# Patient Record
Sex: FEMALE | Race: OTHER | HISPANIC OR LATINO | Employment: OTHER | URBAN - METROPOLITAN AREA
[De-identification: names, ages, dates, MRNs, and addresses within clinical notes are randomized per-mention and may not be internally consistent; named-entity substitution may affect disease eponyms.]

---

## 2022-07-04 ENCOUNTER — HOSPITAL ENCOUNTER (OUTPATIENT)
Facility: HOSPITAL | Age: 87
Setting detail: OBSERVATION
Discharge: HOME | End: 2022-07-05
Attending: EMERGENCY MEDICINE | Admitting: INTERNAL MEDICINE
Payer: MEDICAID

## 2022-07-04 ENCOUNTER — APPOINTMENT (EMERGENCY)
Dept: CT IMAGING | Facility: HOSPITAL | Age: 87
End: 2022-07-04
Payer: MEDICAID

## 2022-07-04 ENCOUNTER — APPOINTMENT (EMERGENCY)
Dept: RADIOLOGY | Facility: HOSPITAL | Age: 87
End: 2022-07-04
Payer: MEDICAID

## 2022-07-04 DIAGNOSIS — R77.8 ELEVATED TROPONIN: ICD-10-CM

## 2022-07-04 DIAGNOSIS — R07.9 CHEST PAIN: Primary | ICD-10-CM

## 2022-07-04 DIAGNOSIS — N17.9 AKI (ACUTE KIDNEY INJURY) (HCC): ICD-10-CM

## 2022-07-04 LAB
2HR DELTA HS TROPONIN: 19 NG/L
ANION GAP SERPL CALCULATED.3IONS-SCNC: 14 MMOL/L (ref 4–13)
ATRIAL RATE: 110 BPM
ATRIAL RATE: 122 BPM
ATRIAL RATE: 125 BPM
BASOPHILS # BLD AUTO: 0.04 THOUSANDS/ΜL (ref 0–0.1)
BASOPHILS NFR BLD AUTO: 0 % (ref 0–1)
BUN SERPL-MCNC: 42 MG/DL (ref 5–25)
CALCIUM SERPL-MCNC: 8.9 MG/DL (ref 8.3–10.1)
CARDIAC TROPONIN I PNL SERPL HS: 11 NG/L
CARDIAC TROPONIN I PNL SERPL HS: 30 NG/L
CHLORIDE SERPL-SCNC: 108 MMOL/L (ref 100–108)
CO2 SERPL-SCNC: 23 MMOL/L (ref 21–32)
CREAT SERPL-MCNC: 1.98 MG/DL (ref 0.6–1.3)
D DIMER PPP FEU-MCNC: 0.56 UG/ML FEU
EOSINOPHIL # BLD AUTO: 0.02 THOUSAND/ΜL (ref 0–0.61)
EOSINOPHIL NFR BLD AUTO: 0 % (ref 0–6)
ERYTHROCYTE [DISTWIDTH] IN BLOOD BY AUTOMATED COUNT: 13.7 % (ref 11.6–15.1)
GFR SERPL CREATININE-BSD FRML MDRD: 22 ML/MIN/1.73SQ M
GLUCOSE SERPL-MCNC: 156 MG/DL (ref 65–140)
HCT VFR BLD AUTO: 28.6 % (ref 34.8–46.1)
HGB BLD-MCNC: 9.7 G/DL (ref 11.5–15.4)
IMM GRANULOCYTES # BLD AUTO: 0.04 THOUSAND/UL (ref 0–0.2)
IMM GRANULOCYTES NFR BLD AUTO: 0 % (ref 0–2)
LYMPHOCYTES # BLD AUTO: 3.55 THOUSANDS/ΜL (ref 0.6–4.47)
LYMPHOCYTES NFR BLD AUTO: 28 % (ref 14–44)
MCH RBC QN AUTO: 30.4 PG (ref 26.8–34.3)
MCHC RBC AUTO-ENTMCNC: 33.9 G/DL (ref 31.4–37.4)
MCV RBC AUTO: 90 FL (ref 82–98)
MONOCYTES # BLD AUTO: 0.85 THOUSAND/ΜL (ref 0.17–1.22)
MONOCYTES NFR BLD AUTO: 7 % (ref 4–12)
NEUTROPHILS # BLD AUTO: 8.01 THOUSANDS/ΜL (ref 1.85–7.62)
NEUTS SEG NFR BLD AUTO: 65 % (ref 43–75)
NRBC BLD AUTO-RTO: 0 /100 WBCS
P AXIS: 66 DEGREES
P AXIS: 81 DEGREES
P AXIS: 82 DEGREES
PLATELET # BLD AUTO: 537 THOUSANDS/UL (ref 149–390)
PMV BLD AUTO: 9.8 FL (ref 8.9–12.7)
POTASSIUM SERPL-SCNC: 4.5 MMOL/L (ref 3.5–5.3)
PR INTERVAL: 140 MS
PR INTERVAL: 142 MS
PR INTERVAL: 144 MS
QRS AXIS: 15 DEGREES
QRS AXIS: 2 DEGREES
QRS AXIS: 8 DEGREES
QRSD INTERVAL: 60 MS
QRSD INTERVAL: 64 MS
QRSD INTERVAL: 68 MS
QT INTERVAL: 298 MS
QT INTERVAL: 304 MS
QT INTERVAL: 314 MS
QTC INTERVAL: 403 MS
QTC INTERVAL: 433 MS
QTC INTERVAL: 453 MS
RBC # BLD AUTO: 3.19 MILLION/UL (ref 3.81–5.12)
SODIUM SERPL-SCNC: 145 MMOL/L (ref 136–145)
T WAVE AXIS: 48 DEGREES
T WAVE AXIS: 53 DEGREES
T WAVE AXIS: 60 DEGREES
VENTRICULAR RATE: 110 BPM
VENTRICULAR RATE: 122 BPM
VENTRICULAR RATE: 125 BPM
WBC # BLD AUTO: 12.51 THOUSAND/UL (ref 4.31–10.16)

## 2022-07-04 PROCEDURE — 36415 COLL VENOUS BLD VENIPUNCTURE: CPT

## 2022-07-04 PROCEDURE — 71046 X-RAY EXAM CHEST 2 VIEWS: CPT

## 2022-07-04 PROCEDURE — 99285 EMERGENCY DEPT VISIT HI MDM: CPT

## 2022-07-04 PROCEDURE — 85025 COMPLETE CBC W/AUTO DIFF WBC: CPT

## 2022-07-04 PROCEDURE — 96374 THER/PROPH/DIAG INJ IV PUSH: CPT

## 2022-07-04 PROCEDURE — 80076 HEPATIC FUNCTION PANEL: CPT | Performed by: PHYSICIAN ASSISTANT

## 2022-07-04 PROCEDURE — 96375 TX/PRO/DX INJ NEW DRUG ADDON: CPT

## 2022-07-04 PROCEDURE — G1004 CDSM NDSC: HCPCS

## 2022-07-04 PROCEDURE — 80048 BASIC METABOLIC PNL TOTAL CA: CPT

## 2022-07-04 PROCEDURE — 93010 ELECTROCARDIOGRAM REPORT: CPT

## 2022-07-04 PROCEDURE — 96366 THER/PROPH/DIAG IV INF ADDON: CPT

## 2022-07-04 PROCEDURE — 84443 ASSAY THYROID STIM HORMONE: CPT | Performed by: EMERGENCY MEDICINE

## 2022-07-04 PROCEDURE — 83690 ASSAY OF LIPASE: CPT | Performed by: PHYSICIAN ASSISTANT

## 2022-07-04 PROCEDURE — 74174 CTA ABD&PLVS W/CONTRAST: CPT

## 2022-07-04 PROCEDURE — 96365 THER/PROPH/DIAG IV INF INIT: CPT

## 2022-07-04 PROCEDURE — 85379 FIBRIN DEGRADATION QUANT: CPT

## 2022-07-04 PROCEDURE — 93005 ELECTROCARDIOGRAM TRACING: CPT

## 2022-07-04 PROCEDURE — 99285 EMERGENCY DEPT VISIT HI MDM: CPT | Performed by: EMERGENCY MEDICINE

## 2022-07-04 PROCEDURE — 84484 ASSAY OF TROPONIN QUANT: CPT

## 2022-07-04 PROCEDURE — 71275 CT ANGIOGRAPHY CHEST: CPT

## 2022-07-04 RX ORDER — LOSARTAN POTASSIUM 100 MG/1
100 TABLET ORAL DAILY
COMMUNITY
Start: 2022-06-21 | End: 2022-08-24 | Stop reason: SDUPTHER

## 2022-07-04 RX ORDER — QUETIAPINE FUMARATE 25 MG/1
50 TABLET, FILM COATED ORAL DAILY
COMMUNITY
Start: 2022-06-21 | End: 2022-08-24 | Stop reason: SDUPTHER

## 2022-07-04 RX ORDER — NITROGLYCERIN 0.4 MG/1
0.4 TABLET SUBLINGUAL ONCE
Status: COMPLETED | OUTPATIENT
Start: 2022-07-04 | End: 2022-07-04

## 2022-07-04 RX ORDER — CLOPIDOGREL BISULFATE 75 MG/1
75 TABLET ORAL DAILY
COMMUNITY
Start: 2022-06-21

## 2022-07-04 RX ORDER — FENTANYL CITRATE 50 UG/ML
25 INJECTION, SOLUTION INTRAMUSCULAR; INTRAVENOUS ONCE
Status: COMPLETED | OUTPATIENT
Start: 2022-07-04 | End: 2022-07-04

## 2022-07-04 RX ORDER — SODIUM CHLORIDE 9 MG/ML
3 INJECTION INTRAVENOUS
Status: DISCONTINUED | OUTPATIENT
Start: 2022-07-04 | End: 2022-07-05 | Stop reason: HOSPADM

## 2022-07-04 RX ORDER — MEMANTINE HYDROCHLORIDE 10 MG/1
20 TABLET ORAL DAILY
COMMUNITY
Start: 2022-06-21

## 2022-07-04 RX ORDER — ROSUVASTATIN CALCIUM 10 MG/1
10 TABLET, COATED ORAL DAILY
COMMUNITY
Start: 2022-06-21 | End: 2022-08-24 | Stop reason: SDUPTHER

## 2022-07-04 RX ORDER — LIDOCAINE 50 MG/G
1 PATCH TOPICAL ONCE
Status: COMPLETED | OUTPATIENT
Start: 2022-07-04 | End: 2022-07-05

## 2022-07-04 RX ORDER — LORAZEPAM 2 MG/ML
0.5 INJECTION INTRAMUSCULAR ONCE
Status: COMPLETED | OUTPATIENT
Start: 2022-07-04 | End: 2022-07-04

## 2022-07-04 RX ORDER — ACETAMINOPHEN,DIPHENHYDRAMINE HCL 500; 25 MG/1; MG/1
1 TABLET, FILM COATED ORAL
COMMUNITY
End: 2022-10-14

## 2022-07-04 RX ORDER — AMLODIPINE BESYLATE 10 MG/1
10 TABLET ORAL DAILY
COMMUNITY
Start: 2022-06-21 | End: 2022-08-24 | Stop reason: SDUPTHER

## 2022-07-04 RX ADMIN — LORAZEPAM 0.5 MG: 2 INJECTION INTRAMUSCULAR; INTRAVENOUS at 23:09

## 2022-07-04 RX ADMIN — NITROGLYCERIN 0.4 MG: 0.4 TABLET SUBLINGUAL at 23:11

## 2022-07-04 RX ADMIN — SODIUM CHLORIDE, SODIUM LACTATE, POTASSIUM CHLORIDE, AND CALCIUM CHLORIDE 500 ML: .6; .31; .03; .02 INJECTION, SOLUTION INTRAVENOUS at 23:00

## 2022-07-04 RX ADMIN — LIDOCAINE 1 PATCH: 50 PATCH CUTANEOUS at 20:55

## 2022-07-04 RX ADMIN — IOHEXOL 65 ML: 350 INJECTION, SOLUTION INTRAVENOUS at 22:01

## 2022-07-04 RX ADMIN — SODIUM CHLORIDE, SODIUM LACTATE, POTASSIUM CHLORIDE, AND CALCIUM CHLORIDE 1000 ML: .6; .31; .03; .02 INJECTION, SOLUTION INTRAVENOUS at 20:47

## 2022-07-04 RX ADMIN — NITROGLYCERIN 0.4 MG: 0.4 TABLET SUBLINGUAL at 22:03

## 2022-07-04 RX ADMIN — FENTANYL CITRATE 25 MCG: 50 INJECTION, SOLUTION INTRAMUSCULAR; INTRAVENOUS at 22:04

## 2022-07-05 ENCOUNTER — APPOINTMENT (OUTPATIENT)
Dept: NON INVASIVE DIAGNOSTICS | Facility: HOSPITAL | Age: 87
End: 2022-07-05
Payer: MEDICAID

## 2022-07-05 VITALS
SYSTOLIC BLOOD PRESSURE: 149 MMHG | HEART RATE: 110 BPM | DIASTOLIC BLOOD PRESSURE: 86 MMHG | RESPIRATION RATE: 18 BRPM | TEMPERATURE: 99.4 F | HEIGHT: 59 IN | OXYGEN SATURATION: 96 % | BODY MASS INDEX: 23.39 KG/M2 | WEIGHT: 116 LBS

## 2022-07-05 PROBLEM — R65.10 SIRS (SYSTEMIC INFLAMMATORY RESPONSE SYNDROME) (HCC): Status: ACTIVE | Noted: 2022-07-05

## 2022-07-05 PROBLEM — N18.9 CHRONIC KIDNEY DISEASE: Status: ACTIVE | Noted: 2022-07-05

## 2022-07-05 PROBLEM — R07.9 CHEST PAIN: Status: ACTIVE | Noted: 2022-07-05

## 2022-07-05 PROBLEM — R41.0 CONFUSION: Status: ACTIVE | Noted: 2022-07-05

## 2022-07-05 PROBLEM — I10 HTN (HYPERTENSION): Status: ACTIVE | Noted: 2022-07-05

## 2022-07-05 LAB
4HR DELTA HS TROPONIN: 55 NG/L
ALBUMIN SERPL BCP-MCNC: 3.1 G/DL (ref 3.5–5)
ALBUMIN SERPL BCP-MCNC: 3.4 G/DL (ref 3.5–5)
ALP SERPL-CCNC: 102 U/L (ref 46–116)
ALP SERPL-CCNC: 92 U/L (ref 46–116)
ALT SERPL W P-5'-P-CCNC: 17 U/L (ref 12–78)
ALT SERPL W P-5'-P-CCNC: 23 U/L (ref 12–78)
ANION GAP SERPL CALCULATED.3IONS-SCNC: 12 MMOL/L (ref 4–13)
AORTIC ROOT: 2.8 CM
AORTIC VALVE MEAN VELOCITY: 15.4 M/S
APICAL FOUR CHAMBER EJECTION FRACTION: 67 %
ASCENDING AORTA: 2.8 CM
AST SERPL W P-5'-P-CCNC: 13 U/L (ref 5–45)
AST SERPL W P-5'-P-CCNC: 17 U/L (ref 5–45)
ATRIAL RATE: 101 BPM
AV AREA BY CONTINUOUS VTI: 1.5 CM2
AV AREA PEAK VELOCITY: 1.8 CM2
AV LVOT MEAN GRADIENT: 4 MMHG
AV LVOT PEAK GRADIENT: 7 MMHG
AV MEAN GRADIENT: 11 MMHG
AV PEAK GRADIENT: 19 MMHG
AV VALVE AREA: 1.46 CM2
AV VELOCITY RATIO: 0.58
BASOPHILS # BLD AUTO: 0.06 THOUSANDS/ΜL (ref 0–0.1)
BASOPHILS NFR BLD AUTO: 1 % (ref 0–1)
BILIRUB DIRECT SERPL-MCNC: 0.1 MG/DL (ref 0–0.2)
BILIRUB SERPL-MCNC: 0.22 MG/DL (ref 0.2–1)
BILIRUB SERPL-MCNC: 0.28 MG/DL (ref 0.2–1)
BILIRUB UR QL STRIP: NEGATIVE
BUN SERPL-MCNC: 30 MG/DL (ref 5–25)
CALCIUM ALBUM COR SERPL-MCNC: 9.1 MG/DL (ref 8.3–10.1)
CALCIUM SERPL-MCNC: 8.6 MG/DL (ref 8.3–10.1)
CARDIAC TROPONIN I PNL SERPL HS: 180 NG/L (ref 8–18)
CARDIAC TROPONIN I PNL SERPL HS: 66 NG/L
CHLORIDE SERPL-SCNC: 108 MMOL/L (ref 100–108)
CLARITY UR: CLEAR
CO2 SERPL-SCNC: 22 MMOL/L (ref 21–32)
COLOR UR: YELLOW
CREAT SERPL-MCNC: 1.54 MG/DL (ref 0.6–1.3)
DOP CALC AO PEAK VEL: 2.18 M/S
DOP CALC AO VTI: 46.11 CM
DOP CALC LVOT AREA: 3.14 CM2
DOP CALC LVOT DIAMETER: 2 CM
DOP CALC LVOT PEAK VEL VTI: 21.46 CM
DOP CALC LVOT PEAK VEL: 1.26 M/S
DOP CALC LVOT STROKE VOLUME: 67.38 CM3
E WAVE DECELERATION TIME: 135 MS
EOSINOPHIL # BLD AUTO: 0.06 THOUSAND/ΜL (ref 0–0.61)
EOSINOPHIL NFR BLD AUTO: 1 % (ref 0–6)
ERYTHROCYTE [DISTWIDTH] IN BLOOD BY AUTOMATED COUNT: 13.5 % (ref 11.6–15.1)
FRACTIONAL SHORTENING: 28 % (ref 28–44)
GFR SERPL CREATININE-BSD FRML MDRD: 30 ML/MIN/1.73SQ M
GLUCOSE SERPL-MCNC: 102 MG/DL (ref 65–140)
GLUCOSE UR STRIP-MCNC: NEGATIVE MG/DL
HCT VFR BLD AUTO: 25.2 % (ref 34.8–46.1)
HGB BLD-MCNC: 8.5 G/DL (ref 11.5–15.4)
HGB UR QL STRIP.AUTO: NEGATIVE
IMM GRANULOCYTES # BLD AUTO: 0.03 THOUSAND/UL (ref 0–0.2)
IMM GRANULOCYTES NFR BLD AUTO: 0 % (ref 0–2)
INTERVENTRICULAR SEPTUM IN DIASTOLE (PARASTERNAL SHORT AXIS VIEW): 1.7 CM
INTERVENTRICULAR SEPTUM: 1.7 CM (ref 0.6–1.1)
KETONES UR STRIP-MCNC: NEGATIVE MG/DL
LAAS-AP4: 20.1 CM2
LEFT ATRIUM SIZE: 4 CM
LEFT INTERNAL DIMENSION IN SYSTOLE: 2.8 CM (ref 2.1–4)
LEFT VENTRICULAR INTERNAL DIMENSION IN DIASTOLE: 3.9 CM (ref 3.5–6)
LEFT VENTRICULAR POSTERIOR WALL IN END DIASTOLE: 1 CM
LEFT VENTRICULAR STROKE VOLUME: 38 ML
LEUKOCYTE ESTERASE UR QL STRIP: NEGATIVE
LIPASE SERPL-CCNC: 179 U/L (ref 73–393)
LIPASE SERPL-CCNC: 211 U/L (ref 73–393)
LVSV (TEICH): 38 ML
LYMPHOCYTES # BLD AUTO: 2.69 THOUSANDS/ΜL (ref 0.6–4.47)
LYMPHOCYTES NFR BLD AUTO: 26 % (ref 14–44)
MCH RBC QN AUTO: 29.9 PG (ref 26.8–34.3)
MCHC RBC AUTO-ENTMCNC: 33.7 G/DL (ref 31.4–37.4)
MCV RBC AUTO: 89 FL (ref 82–98)
MONOCYTES # BLD AUTO: 0.93 THOUSAND/ΜL (ref 0.17–1.22)
MONOCYTES NFR BLD AUTO: 9 % (ref 4–12)
MV E'TISSUE VEL-SEP: 11 CM/S
MV PEAK A VEL: 1.48 M/S
MV PEAK E VEL: 120 CM/S
MV STENOSIS PRESSURE HALF TIME: 39 MS
MV VALVE AREA P 1/2 METHOD: 5.64 CM2
NEUTROPHILS # BLD AUTO: 6.6 THOUSANDS/ΜL (ref 1.85–7.62)
NEUTS SEG NFR BLD AUTO: 63 % (ref 43–75)
NITRITE UR QL STRIP: NEGATIVE
NRBC BLD AUTO-RTO: 0 /100 WBCS
P AXIS: 73 DEGREES
PH UR STRIP.AUTO: 7.5 [PH]
PLATELET # BLD AUTO: 454 THOUSANDS/UL (ref 149–390)
PMV BLD AUTO: 9.9 FL (ref 8.9–12.7)
POTASSIUM SERPL-SCNC: 4.3 MMOL/L (ref 3.5–5.3)
PR INTERVAL: 162 MS
PROT SERPL-MCNC: 6.5 G/DL (ref 6.4–8.2)
PROT SERPL-MCNC: 7.2 G/DL (ref 6.4–8.2)
PROT UR STRIP-MCNC: NEGATIVE MG/DL
QRS AXIS: -2 DEGREES
QRSD INTERVAL: 70 MS
QT INTERVAL: 344 MS
QTC INTERVAL: 446 MS
RBC # BLD AUTO: 2.84 MILLION/UL (ref 3.81–5.12)
RIGHT VENTRICLE ID DIMENSION: 3.1 CM
SL CV PED ECHO LEFT VENTRICLE DIASTOLIC VOLUME (MOD BIPLANE) 2D: 66 ML
SL CV PED ECHO LEFT VENTRICLE SYSTOLIC VOLUME (MOD BIPLANE) 2D: 28 ML
SODIUM SERPL-SCNC: 142 MMOL/L (ref 136–145)
SP GR UR STRIP.AUTO: 1.01 (ref 1–1.03)
T WAVE AXIS: 20 DEGREES
TR MAX PG: 32 MMHG
TR PEAK VELOCITY: 2.8 M/S
TRICUSPID VALVE PEAK REGURGITATION VELOCITY: 2.81 M/S
TSH SERPL DL<=0.05 MIU/L-ACNC: 0.88 UIU/ML (ref 0.45–4.5)
UROBILINOGEN UR QL STRIP.AUTO: 0.2 E.U./DL
VENTRICULAR RATE: 101 BPM
WBC # BLD AUTO: 10.37 THOUSAND/UL (ref 4.31–10.16)

## 2022-07-05 PROCEDURE — 81003 URINALYSIS AUTO W/O SCOPE: CPT | Performed by: PHYSICIAN ASSISTANT

## 2022-07-05 PROCEDURE — 93010 ELECTROCARDIOGRAM REPORT: CPT

## 2022-07-05 PROCEDURE — 93306 TTE W/DOPPLER COMPLETE: CPT

## 2022-07-05 PROCEDURE — 85025 COMPLETE CBC W/AUTO DIFF WBC: CPT | Performed by: PHYSICIAN ASSISTANT

## 2022-07-05 PROCEDURE — 83690 ASSAY OF LIPASE: CPT | Performed by: PHYSICIAN ASSISTANT

## 2022-07-05 PROCEDURE — 93005 ELECTROCARDIOGRAM TRACING: CPT

## 2022-07-05 PROCEDURE — 84484 ASSAY OF TROPONIN QUANT: CPT

## 2022-07-05 PROCEDURE — 99217 PR OBSERVATION CARE DISCHARGE MANAGEMENT: CPT | Performed by: STUDENT IN AN ORGANIZED HEALTH CARE EDUCATION/TRAINING PROGRAM

## 2022-07-05 PROCEDURE — 99245 OFF/OP CONSLTJ NEW/EST HI 55: CPT | Performed by: INTERNAL MEDICINE

## 2022-07-05 PROCEDURE — 36415 COLL VENOUS BLD VENIPUNCTURE: CPT

## 2022-07-05 PROCEDURE — 84484 ASSAY OF TROPONIN QUANT: CPT | Performed by: PHYSICIAN ASSISTANT

## 2022-07-05 PROCEDURE — 93306 TTE W/DOPPLER COMPLETE: CPT | Performed by: INTERNAL MEDICINE

## 2022-07-05 PROCEDURE — 99220 PR INITIAL OBSERVATION CARE/DAY 70 MINUTES: CPT | Performed by: STUDENT IN AN ORGANIZED HEALTH CARE EDUCATION/TRAINING PROGRAM

## 2022-07-05 PROCEDURE — 80053 COMPREHEN METABOLIC PANEL: CPT | Performed by: PHYSICIAN ASSISTANT

## 2022-07-05 PROCEDURE — 96375 TX/PRO/DX INJ NEW DRUG ADDON: CPT

## 2022-07-05 RX ORDER — QUETIAPINE FUMARATE 25 MG/1
50 TABLET, FILM COATED ORAL DAILY
Status: DISCONTINUED | OUTPATIENT
Start: 2022-07-05 | End: 2022-07-05 | Stop reason: HOSPADM

## 2022-07-05 RX ORDER — QUETIAPINE FUMARATE 25 MG/1
12.5 TABLET, FILM COATED ORAL
Status: DISCONTINUED | OUTPATIENT
Start: 2022-07-05 | End: 2022-07-05 | Stop reason: HOSPADM

## 2022-07-05 RX ORDER — METOPROLOL TARTRATE 5 MG/5ML
2.5 INJECTION INTRAVENOUS ONCE
Status: COMPLETED | OUTPATIENT
Start: 2022-07-05 | End: 2022-07-05

## 2022-07-05 RX ORDER — HEPARIN SODIUM 5000 [USP'U]/ML
5000 INJECTION, SOLUTION INTRAVENOUS; SUBCUTANEOUS EVERY 8 HOURS SCHEDULED
Status: DISCONTINUED | OUTPATIENT
Start: 2022-07-05 | End: 2022-07-05 | Stop reason: HOSPADM

## 2022-07-05 RX ORDER — HYDROMORPHONE HCL IN WATER/PF 6 MG/30 ML
0.2 PATIENT CONTROLLED ANALGESIA SYRINGE INTRAVENOUS EVERY 4 HOURS PRN
Status: DISCONTINUED | OUTPATIENT
Start: 2022-07-05 | End: 2022-07-05

## 2022-07-05 RX ORDER — ACETAMINOPHEN 325 MG/1
650 TABLET ORAL EVERY 6 HOURS PRN
Status: DISCONTINUED | OUTPATIENT
Start: 2022-07-05 | End: 2022-07-05 | Stop reason: HOSPADM

## 2022-07-05 RX ORDER — MEMANTINE HYDROCHLORIDE 5 MG/1
20 TABLET ORAL DAILY
Status: DISCONTINUED | OUTPATIENT
Start: 2022-07-05 | End: 2022-07-05 | Stop reason: HOSPADM

## 2022-07-05 RX ORDER — AMLODIPINE BESYLATE 10 MG/1
10 TABLET ORAL DAILY
Status: DISCONTINUED | OUTPATIENT
Start: 2022-07-05 | End: 2022-07-05 | Stop reason: HOSPADM

## 2022-07-05 RX ORDER — OXYCODONE HYDROCHLORIDE 5 MG/1
2.5 TABLET ORAL EVERY 4 HOURS PRN
Status: DISCONTINUED | OUTPATIENT
Start: 2022-07-05 | End: 2022-07-05 | Stop reason: HOSPADM

## 2022-07-05 RX ORDER — BISOPROLOL FUMARATE 5 MG/1
2.5 TABLET, FILM COATED ORAL DAILY
Status: DISCONTINUED | OUTPATIENT
Start: 2022-07-05 | End: 2022-07-05 | Stop reason: HOSPADM

## 2022-07-05 RX ORDER — SODIUM CHLORIDE 9 MG/ML
75 INJECTION, SOLUTION INTRAVENOUS CONTINUOUS
Status: DISCONTINUED | OUTPATIENT
Start: 2022-07-05 | End: 2022-07-05

## 2022-07-05 RX ORDER — BISOPROLOL FUMARATE 5 MG/1
2.5 TABLET, FILM COATED ORAL DAILY
Qty: 15 TABLET | Refills: 0 | Status: SHIPPED | OUTPATIENT
Start: 2022-07-05 | End: 2022-08-24 | Stop reason: SDUPTHER

## 2022-07-05 RX ORDER — PRAVASTATIN SODIUM 80 MG/1
80 TABLET ORAL
Status: DISCONTINUED | OUTPATIENT
Start: 2022-07-05 | End: 2022-07-05 | Stop reason: HOSPADM

## 2022-07-05 RX ORDER — CLOPIDOGREL BISULFATE 75 MG/1
75 TABLET ORAL DAILY
Status: DISCONTINUED | OUTPATIENT
Start: 2022-07-05 | End: 2022-07-05 | Stop reason: HOSPADM

## 2022-07-05 RX ORDER — HYDROMORPHONE HCL IN WATER/PF 6 MG/30 ML
0.2 PATIENT CONTROLLED ANALGESIA SYRINGE INTRAVENOUS EVERY 4 HOURS PRN
Status: DISCONTINUED | OUTPATIENT
Start: 2022-07-05 | End: 2022-07-05 | Stop reason: HOSPADM

## 2022-07-05 RX ORDER — OLANZAPINE 5 MG/1
5 TABLET ORAL EVERY 6 HOURS PRN
Status: DISCONTINUED | OUTPATIENT
Start: 2022-07-05 | End: 2022-07-05 | Stop reason: HOSPADM

## 2022-07-05 RX ORDER — ONDANSETRON 2 MG/ML
4 INJECTION INTRAMUSCULAR; INTRAVENOUS EVERY 6 HOURS PRN
Status: DISCONTINUED | OUTPATIENT
Start: 2022-07-05 | End: 2022-07-05 | Stop reason: HOSPADM

## 2022-07-05 RX ADMIN — OLANZAPINE 5 MG: 5 TABLET, FILM COATED ORAL at 08:16

## 2022-07-05 RX ADMIN — MEMANTINE 20 MG: 5 TABLET ORAL at 08:17

## 2022-07-05 RX ADMIN — METOPROLOL TARTRATE 2.5 MG: 1 INJECTION, SOLUTION INTRAVENOUS at 00:10

## 2022-07-05 RX ADMIN — QUETIAPINE FUMARATE 50 MG: 25 TABLET ORAL at 08:16

## 2022-07-05 RX ADMIN — HEPARIN SODIUM 5000 UNITS: 5000 INJECTION INTRAVENOUS; SUBCUTANEOUS at 06:33

## 2022-07-05 RX ADMIN — AMLODIPINE BESYLATE 10 MG: 10 TABLET ORAL at 08:17

## 2022-07-05 RX ADMIN — QUETIAPINE FUMARATE 12.5 MG: 25 TABLET ORAL at 03:43

## 2022-07-05 RX ADMIN — SODIUM CHLORIDE 75 ML/HR: 0.9 INJECTION, SOLUTION INTRAVENOUS at 01:53

## 2022-07-05 RX ADMIN — BISOPROLOL FUMARATE 2.5 MG: 5 TABLET ORAL at 14:18

## 2022-07-05 RX ADMIN — CLOPIDOGREL BISULFATE 75 MG: 75 TABLET ORAL at 08:17

## 2022-07-05 NOTE — ED PROVIDER NOTES
History  Chief Complaint   Patient presents with    Chest Pain     Per EMS pt c/o chest pain for 1 hr  EMS gave 325mg Aspirin and 1 of Nitro  49-year-old Lao-speaking woman with relevant past med history of hyperlipidemia, hypertension, and dementia presents with chest pain  On arrival, she does not answer the questions of the  aside from saying that her chest pain has improved  Her granddaughter-in-law arrived, and further history was obtained  She was in her normal state of health until 1 hour ago when she developed chest pain while sitting outside  She described as substernal as well as radiating into the right shoulder  Prior to this, she was starting to get anxious with a number of people coming out of the house  She then had 2 episodes of vomiting, prompting family to call 911  She normally receives care in [place] and is visiting family  On route, she received 324 mg aspirin and one sublingual nitro by EMS  Prior to Admission Medications   Prescriptions Last Dose Informant Patient Reported? Taking? Diclofenac Sodium (VOLTAREN) 1 %   Yes Yes   Sig: Apply 4 g topically 2 (two) times a day   QUEtiapine (SEROquel) 25 mg tablet   Yes Yes   Sig: Take 50 mg by mouth daily   amLODIPine (NORVASC) 10 mg tablet   Yes Yes   Sig: Take 10 mg by mouth daily   clopidogrel (PLAVIX) 75 mg tablet   Yes Yes   Sig: Take 75 mg by mouth daily   diphenhydrAMINE-acetaminophen (TYLENOL PM)  MG TABS   Yes Yes   Sig: Take 1 tablet by mouth daily at bedtime as needed for sleep   hydrocortisone 2 5 % cream   Yes Yes   Sig: Apply to anus 3-4 times a day as needed for pain     losartan (COZAAR) 100 MG tablet   Yes Yes   Sig: Take 100 mg by mouth daily   memantine (NAMENDA) 10 mg tablet   Yes Yes   Sig: Take 20 mg by mouth daily   psyllium (METAMUCIL) 58 6 % packet   Yes Yes   Sig: Take 3 4 g by mouth daily   rosuvastatin (CRESTOR) 10 MG tablet   Yes Yes   Sig: Take 10 mg by mouth daily Facility-Administered Medications: None       No past medical history on file  No past surgical history on file  No family history on file  I have reviewed and agree with the history as documented  No existing history information found  No existing history information found  Review of Systems   Constitutional: Negative for chills and fever  HENT: Negative for ear pain and sore throat  Eyes: Negative for pain and visual disturbance  Respiratory: Positive for shortness of breath  Negative for cough and wheezing  Cardiovascular: Positive for chest pain  Negative for palpitations  Gastrointestinal: Negative for abdominal pain, constipation, diarrhea and vomiting  Genitourinary: Negative for dysuria, frequency, hematuria and vaginal bleeding  Musculoskeletal: Negative for arthralgias and back pain  Skin: Negative for color change and rash  Neurological: Negative for seizures, syncope and headaches  Psychiatric/Behavioral: Negative for agitation and confusion  Physical Exam  ED Triage Vitals   Temperature Pulse Respirations Blood Pressure SpO2   07/04/22 2005 07/04/22 2005 07/04/22 2005 07/04/22 2017 07/04/22 2005   97 9 °F (36 6 °C) (!) 120 20 113/56 98 %      Temp Source Heart Rate Source Patient Position - Orthostatic VS BP Location FiO2 (%)   07/04/22 2005 07/04/22 2005 07/04/22 2017 07/04/22 2017 --   Oral Monitor Lying Left arm       Pain Score       07/04/22 2115       10 - Worst Possible Pain             Orthostatic Vital Signs  Vitals:    07/04/22 2330 07/05/22 0030 07/05/22 0115 07/05/22 0139   BP: 137/68 147/70  108/82   Pulse: (!) 114 102 (!) 120 (!) 112   Patient Position - Orthostatic VS:  Lying         Physical Exam  Vitals and nursing note reviewed  Constitutional:       General: She is not in acute distress  Appearance: Normal appearance  She is well-developed  HENT:      Head: Normocephalic and atraumatic        Right Ear: External ear normal  Left Ear: External ear normal       Nose: Nose normal  No congestion  Cardiovascular:      Rate and Rhythm: Regular rhythm  Tachycardia present  Pulses:           Radial pulses are 2+ on the right side and 2+ on the left side  Dorsalis pedis pulses are 2+ on the right side and 2+ on the left side  Comments: BP equal in both arms  Pulmonary:      Effort: Pulmonary effort is normal  No respiratory distress  Breath sounds: Normal breath sounds  Abdominal:      Palpations: Abdomen is soft  Tenderness: There is no abdominal tenderness  There is no guarding or rebound  Musculoskeletal:         General: Normal range of motion  Cervical back: Normal range of motion and neck supple  Right lower leg: No edema  Left lower leg: No edema  Skin:     General: Skin is warm and dry  Neurological:      General: No focal deficit present  Mental Status: She is alert  Sensory: No sensory deficit  Motor: No weakness     Psychiatric:         Mood and Affect: Mood normal          Behavior: Behavior normal          ED Medications  Medications   sodium chloride (PF) 0 9 % injection 3 mL (has no administration in time range)   lidocaine (LIDODERM) 5 % patch 1 patch (1 patch Topical Medication Applied 7/4/22 2055)   sodium chloride 0 9 % infusion (75 mL/hr Intravenous New Bag 7/5/22 0153)   lactated ringers bolus 1,000 mL (0 mL Intravenous Stopped 7/4/22 2254)   fentanyl citrate (PF) 100 MCG/2ML 25 mcg (25 mcg Intravenous Given 7/4/22 2204)   nitroglycerin (NITROSTAT) SL tablet 0 4 mg (0 4 mg Sublingual Given 7/4/22 2203)   iohexol (OMNIPAQUE) 350 MG/ML injection (MULTI-DOSE) 65 mL (65 mL Intravenous Given 7/4/22 2201)   lactated ringers bolus 500 mL (0 mL Intravenous Stopped 7/5/22 0100)   LORazepam (ATIVAN) injection 0 5 mg (0 5 mg Intravenous Given 7/4/22 2309)   nitroglycerin (NITROSTAT) SL tablet 0 4 mg (0 4 mg Sublingual Given 7/4/22 2311)   metoprolol (LOPRESSOR) injection 2 5 mg (2 5 mg Intravenous Given 7/5/22 0010)       Diagnostic Studies  Results Reviewed     Procedure Component Value Units Date/Time    Hepatic function panel [[id number]]     Lab Status: No result Specimen: Blood     Lipase [[id number]]     Lab Status: No result Specimen: Blood     TSH [[id number]]  (Normal) Collected: 07/04/22 2253    Lab Status: Final result Specimen: Blood from Arm, Left Updated: 07/05/22 0100     TSH 3RD GENERATON 0 882 uIU/mL     Narrative:      Patients undergoing fluorescein dye angiography may retain small amounts of fluorescein in the body for 48-72 hours post procedure  Samples containing fluorescein can produce falsely depressed TSH values  If the patient had this procedure,a specimen should be resubmitted post fluorescein clearance  HS Troponin I 4hr [[id number]]  (Abnormal) Collected: 07/05/22 0030    Lab Status: Final result Specimen: Blood from Arm, Left Updated: 07/05/22 0058     hs TnI 4hr 66 ng/L      Delta 4hr hsTnI 55 ng/L     HS Troponin I 2hr [[id number]]  (Normal) Collected: 07/04/22 2235    Lab Status: Final result Specimen: Blood from Arm, Left Updated: 07/04/22 2303     hs TnI 2hr 30 ng/L      Delta 2hr hsTnI 19 ng/L     HS Troponin 0hr (reflex protocol) [[id number]]  (Normal) Collected: 07/04/22 2037    Lab Status: Final result Specimen: Blood from Arm, Right Updated: 07/04/22 2110     hs TnI 0hr 11 ng/L     D-dimer, quantitative [[id number]]  (Abnormal) Collected: 07/04/22 2037    Lab Status: Final result Specimen: Blood from Arm, Right Updated: 07/04/22 2106     D-Dimer, Quant 0 56 ug/ml FEU     Narrative: In the evaluation for possible pulmonary embolism, in the appropriate (Well's Score of 4 or less) patient, the age adjusted d-dimer cutoff for this patient can be calculated as:    Age x 0 01 (in ug/mL) for Age-adjusted D-dimer exclusion threshold for a patient over 50 years      Basic metabolic panel [[id number]]  (Abnormal) Collected: 07/04/22 2037 Lab Status: Final result Specimen: Blood from Arm, Right Updated: 07/04/22 2058     Sodium 145 mmol/L      Potassium 4 5 mmol/L      Chloride 108 mmol/L      CO2 23 mmol/L      ANION GAP 14 mmol/L      BUN 42 mg/dL      Creatinine 1 98 mg/dL      Glucose 156 mg/dL      Calcium 8 9 mg/dL      eGFR 22 ml/min/1 73sq m     Narrative:      [place] guidelines for Chronic Kidney Disease (CKD):     Stage 1 with normal or high GFR (GFR > 90 mL/min/1 73 square meters)    Stage 2 Mild CKD (GFR = 60-89 mL/min/1 73 square meters)    Stage 3A Moderate CKD (GFR = 45-59 mL/min/1 73 square meters)    Stage 3B Moderate CKD (GFR = 30-44 mL/min/1 73 square meters)    Stage 4 Severe CKD (GFR = 15-29 mL/min/1 73 square meters)    Stage 5 End Stage CKD (GFR <15 mL/min/1 73 square meters)  Note: GFR calculation is accurate only with a steady state creatinine    CBC and differential [[id number]]  (Abnormal) Collected: 07/04/22 2037    Lab Status: Final result Specimen: Blood from Arm, Right Updated: 07/04/22 2045     WBC 12 51 Thousand/uL      RBC 3 19 Million/uL      Hemoglobin 9 7 g/dL      Hematocrit 28 6 %      MCV 90 fL      MCH 30 4 pg      MCHC 33 9 g/dL      RDW 13 7 %      MPV 9 8 fL      Platelets 470 Thousands/uL      nRBC 0 /100 WBCs      Neutrophils Relative 65 %      Immat GRANS % 0 %      Lymphocytes Relative 28 %      Monocytes Relative 7 %      Eosinophils Relative 0 %      Basophils Relative 0 %      Neutrophils Absolute 8 01 Thousands/µL      Immature Grans Absolute 0 04 Thousand/uL      Lymphocytes Absolute 3 55 Thousands/µL      Monocytes Absolute 0 85 Thousand/µL      Eosinophils Absolute 0 02 Thousand/µL      Basophils Absolute 0 04 Thousands/µL                  CTA dissection protocol chest/abdomen/pelvis   Final Result by Malik Vega MD (07/04 2485)      No evidence of acute aortic pathology  Atherosclerotic changes with focal noncalcified atheromatous plaque versus? Penetrating atheromatous ulcer at the mid descending thoracic segment  Age indeterminate compression fracture deformity at T11 with near total loss of vertebral body height and mild associated bony retropulsion  Correlate clinically and with prior outside imaging if available  Mild nonspecific biliary and pancreatic ductal dilatation without clear evidence of choledocholithiasis  Consider nonemergent follow-up MRI/MRCP as clinically warranted  Additional findings as above  Study was marked in Epic for significant notification  Workstation performed: [id number]         X-ray chest 2 views    (Results Pending)         Procedures  Procedures      ED Course  ED Course as of 07/05/22 0210   Mon Jul 04, 2022 2044 Procedure Note: ECG  Date/Time: 07/04/22 8:44 PM   Interpreted by: Velma Renteria  Indications / Diagnosis: CP  ECG reviewed by me, the ED Provider: yes   The EKG demonstrates:  Rhythm: sinus tachy  Intervals: normal intervals  Axis: normal axis  QRS/Blocks: normal QRS  ST Changes: No acute ST Changes, no STD/ESTELITA  T Waves: No inversions    2106 This ECG was interpreted by me  The ECG demonstrates tachycardia sinus rhythm, normal intervals, normal axis, normal QRS, no acute ST changes present  2110 Patient reporting chest pain has returned and is traveling from center of chest to back  It is a stabbing sensation  GFR is 22 and discussed risks and benefits of CT dissection imaging given SONAM  Family is agreement with pursuing CTA dissection study and understand risks that her kidney function may worsen and may lead to renal failure  2315 Patient still having chest pain  Will trial ativan and another nitro  Tue Jul 05, 2022   0015 CTA dissection protocol chest/abdomen/pelvis  As per CT surgery: "possible ulcer is not causing her chest pain   She can follow up in the office "             HEART Risk Score    Flowsheet Row Most Recent Value   Heart Score Risk Calculator    History 2 Filed at: 07/04/2022 2317   ECG 1 Filed at: 07/04/2022 2317   Age 2 Filed at: 07/04/2022 2317   Risk Factors 2 Filed at: 07/04/2022 2317   Troponin 1 Filed at: 07/04/2022 2317   HEART Score 8 Filed at: 07/04/2022 2317                      SBIRT 22yo+    Flowsheet Row Most Recent Value   SBIRT (25 yo +)    In order to provide better care to our patients, we are screening all of our patients for alcohol and drug use  Would it be okay to ask you these screening questions? No Filed at: 07/04/2022 2100                MDM  Number of Diagnoses or Management Options  SONAM (acute kidney injury) ([facility] [address] )  Chest pain  Elevated troponin  Diagnosis management comments: Presenting with one hour of substernal chest pain  Initially responsive to ASA and nitro via EMS  The pain did return and did not respond to another nitro and fentanyl  Dissection study was added after chest pain returned and patient reporting pain was radiating to her back  Pain did improve with lorazepam and nitro  She remained tachycardic during her ED visit  No dyspnea, tachypnea, or hypoxia noted  D dimer was age-adjusted and sufficient to rule out PE  CTA without evidence of dissection  Small, questionable thoracic ulcer was recommended for outpatient follow up by CT surgery  Trop slightly uptrending during her ED visit possibly secondary to tachycardia  Unknown baseline creatine, but she does have an SONAM, which was treated with IV fluids  Given risk factors and continued chest pain, will admit for obs  Patient in agreement with plan and questions were answered  Portions or all of this note were generated using voice recognition software  Occasional wrong word or "sound a like" substitutions may have occurred due to the inherent limitations of voice recognition software  Please interpret any errors within the intended context of the whole sentence or idea        Disposition  Final diagnoses:   Chest pain   Elevated troponin   SONAM (acute kidney injury) [facility])     Time reflects when diagnosis was documented in both MDM as applicable and the Disposition within this note     Time User Action Codes Description Comment    7/5/2022 12:38 AM Amye Round Add [R07 9] Chest pain     7/5/2022 12:38 AM Amye Round Add [R77 8] Elevated troponin     7/5/2022  2:09 AM Amye Round Add [N17 9] SONAM (acute kidney injury) [facility])       ED Disposition     ED Disposition   Admit    Condition   Stable    Date/Time   Tue Jul 5, 2022 12:39 AM    Comment   Case was discussed with MARY and the patient's admission status was agreed to be Admission Status: observation status to the service of Dr Martín Arango   Follow-up Information    None         Current Discharge Medication List      CONTINUE these medications which have NOT CHANGED    Details   amLODIPine (NORVASC) 10 mg tablet Take 10 mg by mouth daily      clopidogrel (PLAVIX) 75 mg tablet Take 75 mg by mouth daily      Diclofenac Sodium (VOLTAREN) 1 % Apply 4 g topically 2 (two) times a day      diphenhydrAMINE-acetaminophen (TYLENOL PM)  MG TABS Take 1 tablet by mouth daily at bedtime as needed for sleep      hydrocortisone 2 5 % cream Apply to anus 3-4 times a day as needed for pain  losartan (COZAAR) 100 MG tablet Take 100 mg by mouth daily      memantine (NAMENDA) 10 mg tablet Take 20 mg by mouth daily      psyllium (METAMUCIL) 58 6 % packet Take 3 4 g by mouth daily      QUEtiapine (SEROquel) 25 mg tablet Take 50 mg by mouth daily      rosuvastatin (CRESTOR) 10 MG tablet Take 10 mg by mouth daily           No discharge procedures on file  PDMP Review     None           ED Provider  Attending physically available and evaluated Eryn Fernandez I managed the patient along with the ED Attending      Electronically Signed by         Yamileth Zapata MD  07/05/22 1165       Yamileth Zapata MD  07/09/22 4475

## 2022-07-05 NOTE — ASSESSMENT & PLAN NOTE
Lab Results   Component Value Date    EGFR 22 07/04/2022    CREATININE 1 98 (H) 07/04/2022     Baseline appears to be 1 5-1 8 now 1 98  ivf and hold cozaar given contrast load to avoid nadine

## 2022-07-05 NOTE — UTILIZATION REVIEW
Initial Clinical Review    Admission: Date/Time/Statement:   Admission Orders (From admission, onward)     Ordered        07/05/22 0039  Place in Observation  Once                      Orders Placed This Encounter   Procedures    Place in Observation     Standing Status:   Standing     Number of Occurrences:   1     Order Specific Question:   Level of Care     Answer:   Med Surg [16]     ED Arrival Information     Expected   -    Arrival   7/4/2022 19:54    Acuity   Emergent            Means of arrival   Ambulance    Escorted by   [facility] ([address] [facility])    Service   Hospitalist    Admission type   Emergency            Arrival complaint   Chest Pain           Chief Complaint   Patient presents with    Chest Pain     Per EMS pt c/o chest pain for 1 hr  EMS gave 325mg Aspirin and 1 of Nitro  Initial Presentation: 80 y o  female  Presents to ED via  EMS  With chest and abdominal pain, supposedly for past  1-2  Days  Patient is poor historian due to  Dementia, confusion  + nausea    Denies vomiting  In ed she was evaluated by cta dissection study  There was atheromatous plaque in descending T aorta vs penetrating ulcer  Case was d/w ct surgery who felt unlikely related to patient's s/sx  Given advanced age cp for acs rule out was requested  Met SIRS  Criteria in ED with tachycardia, tachypnea and leukocytosis,  unclear etiology  No ischemic changes on  EKG  Troponin peak  66  Additional PMH is  CKD and HTN  Admit  Observation with  Chest pain and plan is  Monitor labs, serial troponin, IVF, tele,  continue home meds and possible cardiology consult         ED Triage Vitals   Temperature Pulse Respirations Blood Pressure SpO2   07/04/22 2005 07/04/22 2005 07/04/22 2005 07/04/22 2017 07/04/22 2005   97 9 °F (36 6 °C) (!) 120 20 113/56 98 %      Temp Source Heart Rate Source Patient Position - Orthostatic VS BP Location FiO2 (%)   07/04/22 2005 07/04/22 2005 07/04/22 2017 07/04/22 2017 -- Oral Monitor Lying Left arm       Pain Score       07/04/22 2115       10 - Worst Possible Pain          Wt Readings from Last 1 Encounters:   No data found for Wt     Additional Vital Signs:   99 4 °F (37 4 °C) 117 Abnormal  -- 149/86 107 96 % -- --    07/05/22 01:39:14 98 3 °F (36 8 °C) 112 Abnormal  18 108/82 91 95 % None (Room air) --   07/05/22 0115 -- 120 Abnormal  -- -- -- -- -- --   07/05/22 0030 -- 102 23 Abnormal  147/70 100 97 % None (Room air) Lying   07/04/22 2330 -- 114 Abnormal  25 Abnormal  137/68 92 98 % -- --   07/04/22 2326 -- 121 Abnormal  -- 137/68 -- -- -- --   07/04/22 2312 -- 133 Abnormal  22 148/82 -- 99 % None (Room air) Sitting   07/04/22 2215 -- 122 Abnormal  24 Abnormal  133/63 91 98 % None (Room air) Lying   07/04/22 2213 -- 118 Abnormal  24 Abnormal  133/63 -- 98 % None (Room air) Lying   07/04/22 2115 -- 124 Abnormal  24 Abnormal  144/75 98 100 % None (Room air) Sitting   07/04/22 2030 -- 110 Abnormal  22 124/63 86 98 % None (Room air) Lying   07/04/22 2026 -- -- -- 124/63 -- -- -- --   07/04/22 2017 -- -- -- 113/56 -- -- -- Lying   07/04/22 2005 97 9 °F (36 6 °C) 120 Abnormal  20 -- -- 98 % None (Room air)        Pertinent Labs/Diagnostic Test Results:   EKG     Rhythm: sinus tachy  Intervals: normal intervals  Axis: normal axis  QRS/Blocks: normal QRS  ST Changes: No acute ST Changes, no STD/ESTELITA  T Waves: No inversions   CTA dissection protocol chest/abdomen/pelvis   Final Result by Raguel Phalen, MD (07/04 2355)      No evidence of acute aortic pathology  Atherosclerotic changes with focal noncalcified atheromatous plaque versus? Penetrating atheromatous ulcer at the mid descending thoracic segment  Age indeterminate compression fracture deformity at T11 with near total loss of vertebral body height and mild associated bony retropulsion  Correlate clinically and with prior outside imaging if available        Mild nonspecific biliary and pancreatic ductal dilatation without clear evidence of choledocholithiasis  Consider nonemergent follow-up MRI/MRCP as clinically warranted  Additional findings as above  Study was marked in Epic for significant notification        Workstation performed: [id number]         X-ray chest 2 views    (Results Pending)         Results from last 7 days   Lab Units 07/05/22  0620 07/04/22 2037   WBC Thousand/uL 10 37* 12 51*   HEMOGLOBIN g/dL 8 5* 9 7*   HEMATOCRIT % 25 2* 28 6*   PLATELETS Thousands/uL 454* 537*   NEUTROS ABS Thousands/µL 6 60 8 01*         Results from last 7 days   Lab Units 07/05/22  0620 07/04/22 2037   SODIUM mmol/L 142 145   POTASSIUM mmol/L 4 3 4 5   CHLORIDE mmol/L 108 108   CO2 mmol/L 22 23   ANION GAP mmol/L 12 14*   BUN mg/dL 30* 42*   CREATININE mg/dL 1 54* 1 98*   EGFR ml/min/1 73sq m 30 22   CALCIUM mg/dL 8 6 8 9     Results from last 7 days   Lab Units 07/05/22  0620 07/04/22  2253   AST U/L 17 13   ALT U/L 23 17   ALK PHOS U/L 102 92   TOTAL PROTEIN g/dL 7 2 6 5   ALBUMIN g/dL 3 4* 3 1*   TOTAL BILIRUBIN mg/dL 0 28 0 22   BILIRUBIN DIRECT mg/dL  --  0 10         Results from last 7 days   Lab Units 07/05/22  0620 07/04/22 2037   GLUCOSE RANDOM mg/dL 102 156*               Results from last 7 days   Lab Units 07/05/22  0030 07/04/22 2235 07/04/22 2037   HS TNI 0HR ng/L  --   --  11   HS TNI 2HR ng/L  --  30  --    HSTNI D2 ng/L  --  19  --    HS TNI 4HR ng/L 66*  --   --    HSTNI D4 ng/L 55*  --   --      Results from last 7 days   Lab Units 07/04/22 2037   D-DIMER QUANTITATIVE ug/ml FEU 0 56*         Results from last 7 days   Lab Units 07/04/22  2253   TSH 3RD GENERATON uIU/mL 0 882               Results from last 7 days   Lab Units 07/04/22  2253   LIPASE u/L 211                 Results from last 7 days   Lab Units 07/05/22  0315   CLARITY UA  Clear   COLOR UA  Yellow   SPEC GRAV UA  1 010   PH UA  7 5   GLUCOSE UA mg/dl Negative   KETONES UA mg/dl Negative   BLOOD UA  Negative   PROTEIN UA mg/dl Negative   NITRITE UA  Negative   BILIRUBIN UA  Negative   UROBILINOGEN UA E U /dl 0 2   LEUKOCYTES UA  Negative           ED Treatment:   Medication Administration from 07/04/2022 1954 to 07/05/2022 0132       Date/Time Order Dose Route Action Comments     07/04/2022 2254 lactated ringers bolus 1,000 mL 0 mL Intravenous Stopped      07/04/2022 2047 lactated ringers bolus 1,000 mL 1,000 mL Intravenous New Bag      07/04/2022 2055 lidocaine (LIDODERM) 5 % patch 1 patch 1 patch Topical Medication Applied      07/04/2022 2204 fentanyl citrate (PF) 100 MCG/2ML 25 mcg 25 mcg Intravenous Given      07/04/2022 2203 nitroglycerin (NITROSTAT) SL tablet 0 4 mg 0 4 mg Sublingual Given      07/04/2022 2201 iohexol (OMNIPAQUE) 350 MG/ML injection (MULTI-DOSE) 65 mL 65 mL Intravenous Given      07/05/2022 0100 lactated ringers bolus 500 mL 0 mL Intravenous Stopped      07/04/2022 2300 lactated ringers bolus 500 mL 500 mL Intravenous New Bag      07/04/2022 2309 LORazepam (ATIVAN) injection 0 5 mg 0 5 mg Intravenous Given      07/04/2022 2311 nitroglycerin (NITROSTAT) SL tablet 0 4 mg 0 4 mg Sublingual Given      07/05/2022 0010 metoprolol (LOPRESSOR) injection 2 5 mg 2 5 mg Intravenous Given Pt HR 112bpm          Admitting Diagnosis: Chest pain [R07 9]  Elevated troponin [R77 8]  Age/Sex: 80 y o  female  Admission Orders:  Scheduled Medications:  amLODIPine, 10 mg, Oral, Daily  clopidogrel, 75 mg, Oral, Daily  heparin (porcine), 5,000 Units, Subcutaneous, Q8H PEDRITO  memantine, 20 mg, Oral, Daily  pravastatin, 80 mg, Oral, Daily With Dinner  QUEtiapine, 12 5 mg, Oral, HS  QUEtiapine, 50 mg, Oral, Daily      Continuous IV Infusions:  sodium chloride, 75 mL/hr, Intravenous, Continuous      PRN Meds:  acetaminophen, 650 mg, Oral, Q6H PRN  HYDROmorphone, 0 2 mg, Intravenous, Q4H PRN  OLANZapine, 5 mg, Oral, Q6H PRN  ondansetron, 4 mg, Intravenous, Q6H PRN  oxyCODONE, 2 5 mg, Oral, Q4H PRN  sodium chloride (PF), 3 mL, Intravenous, Q1H PRN        IP CONSULT TO CARDIOLOGY    Network Utilization Review Department  ATTENTION: Please call with any questions or concerns to [phone] and carefully listen to the prompts so that you are directed to the right person  All voicemails are confidential   Era Jasmin all requests for admission clinical reviews, approved or denied determinations and any other requests to dedicated fax number below belonging to the campus where the patient is receiving treatment   List of dedicated fax numbers for the Facilities:  [address] DENIALS (Administrative/Medical Necessity) [phone]   [address] (Maternity/NICU/Pediatrics) [phone]   [address] [place] [address]  [address] [facility] [place] [address] [address] [place] [address] [address] [facility] [place] [address] [phone]

## 2022-07-05 NOTE — PLAN OF CARE
Problem: Potential for Falls  Goal: Patient will remain free of falls  Description: INTERVENTIONS:  - Educate patient/family on patient safety including physical limitations  - Instruct patient to call for assistance with activity   - Consult OT/PT to assist with strengthening/mobility   - Keep Call bell within reach  - Keep bed low and locked with side rails adjusted as appropriate  - Keep care items and personal belongings within reach  - Initiate and maintain comfort rounds  - Make Fall Risk Sign visible to staff  - Offer Toileting every  Hours, in advance of need  - Initiate/Maintain alarm  - Obtain necessary fall risk management equipment:   - Apply yellow socks and bracelet for high fall risk patients  - Consider moving patient to room near nurses station  Outcome: Progressing     Problem: MOBILITY - ADULT  Goal: Maintain or return to baseline ADL function  Description: INTERVENTIONS:  -  Assess patient's ability to carry out ADLs; assess patient's baseline for ADL function and identify physical deficits which impact ability to perform ADLs (bathing, care of mouth/teeth, toileting, grooming, dressing, etc )  - Assess/evaluate cause of self-care deficits   - Assess range of motion  - Assess patient's mobility; develop plan if impaired  - Assess patient's need for assistive devices and provide as appropriate  - Encourage maximum independence but intervene and supervise when necessary  - Involve family in performance of ADLs  - Assess for home care needs following discharge   - Consider OT consult to assist with ADL evaluation and planning for discharge  - Provide patient education as appropriate  Outcome: Progressing  Goal: Maintains/Returns to pre admission functional level  Description: INTERVENTIONS:  - Perform BMAT or MOVE assessment daily    - Set and communicate daily mobility goal to care team and patient/family/caregiver     - Collaborate with rehabilitation services on mobility goals if consulted  - Perform Range of Motion  times a day  - Reposition patient every  hours    - Dangle patient  times a day  - Stand patient  times a day  - Ambulate patient  times a day  - Out of bed to chair  times a day   - Out of bed for meals times a day  - Out of bed for toileting  - Record patient progress and toleration of activity level   Outcome: Progressing     Problem: PAIN - ADULT  Goal: Verbalizes/displays adequate comfort level or baseline comfort level  Description: Interventions:  - Encourage patient to monitor pain and request assistance  - Assess pain using appropriate pain scale  - Administer analgesics based on type and severity of pain and evaluate response  - Implement non-pharmacological measures as appropriate and evaluate response  - Consider cultural and social influences on pain and pain management  - Notify physician/advanced practitioner if interventions unsuccessful or patient reports new pain  Outcome: Progressing     Problem: INFECTION - ADULT  Goal: Absence or prevention of progression during hospitalization  Description: INTERVENTIONS:  - Assess and monitor for signs and symptoms of infection  - Monitor lab/diagnostic results  - Monitor all insertion sites, i e  indwelling lines, tubes, and drains  - Monitor endotracheal if appropriate and nasal secretions for changes in amount and color  - [place] appropriate cooling/warming therapies per order  - Administer medications as ordered  - Instruct and encourage patient and family to use good hand hygiene technique  - Identify and instruct in appropriate isolation precautions for identified infection/condition  Outcome: Progressing  Goal: Absence of fever/infection during neutropenic period  Description: INTERVENTIONS:  - Monitor WBC    Outcome: Progressing     Problem: Prexisting or High Potential for Compromised Skin Integrity  Goal: Skin integrity is maintained or improved  Description: INTERVENTIONS:  - Identify patients at risk for skin breakdown  - Assess and monitor skin integrity  - Assess and monitor nutrition and hydration status  - Monitor labs   - Assess for incontinence   - Turn and reposition patient  - Assist with mobility/ambulation  - Relieve pressure over bony prominences  - Avoid friction and shearing  - Provide appropriate hygiene as needed including keeping skin clean and dry  - Evaluate need for skin moisturizer/barrier cream  - Collaborate with interdisciplinary team   - Patient/family teaching  - Consider wound care consult   Outcome: Progressing

## 2022-07-05 NOTE — ED NOTES
Pt complaining of chest pain and nausea  Pt says the pain is in the left side and goes into the back  She states " she feels like she cannot breathe"  Provider aware and verbally ordered an EKG        Robert Vaughn RN  07/04/22 2124

## 2022-07-05 NOTE — ASSESSMENT & PLAN NOTE
Cp/abd pain  Was described in ed as radiating to back  Pt presently confused saying it radiates to chest or does not radiate at all and is now improved  -Dissection study negative for acute pathology, shows diffuse atherosclerotic plaque in descending thoraric aorta vs penetrating atheromatous ulcer at the mid descending thoracic segment   As well as age indeterminant T11 vertebral fx  -serial troponins with peak at 66 but no ischemic changes on ekg  -random trop in am monitor on telemetry  -ivf for renal function given ckd  -if troponin I continues to elevate consider cardiology evaluation

## 2022-07-05 NOTE — ASSESSMENT & PLAN NOTE
Suspect sundowning/cognitive impairment vs metabolic encephalopathy 2* sirs  Presently alert and oriented to self, moves all 4 extremities  Continue namenda  Continue to monitor/reorient    Called emergency contact to clarify baseline, VM left

## 2022-07-05 NOTE — ED ATTENDING ATTESTATION
7/4/2022  IKody MD, saw and evaluated the patient  I have discussed the patient with the resident/non-physician practitioner and agree with the resident's/non-physician practitioner's findings, Plan of Care, and MDM as documented in the resident's/non-physician practitioner's note, except where noted  All available labs and Radiology studies were reviewed  I was present for key portions of any procedure(s) performed by the resident/non-physician practitioner and I was immediately available to provide assistance  At this point I agree with the current assessment done in the Emergency Department  I have conducted an independent evaluation of this patient a history and physical is as follows:  Patient is 79 yo female, c/o chest pain, radiation to right shoulder, started about an hour back, no fever, cough, abd pain, N/V/D; got Asa in EMS, pain has improved    On exam, vitals noted for tachycardia, BP stable, O2 sats 98%, Lungs CTA, CVS RRR, Abd soft, NTND, Neuro no focal deficits  D/D: ACS, PE, atypical chest alber, we will check labs, ekg, CXR  ED Course   Patient had recurrent chest pain, radiating to her back, CT dissection study was done, possible penetrating thoracic aortic ulcer was reported  ER resident discussed the case with CT surgery, Dr Anabella Cristina, stated that above finding is not concerning as cause of patient's chest pain/sympotms, does not need any intervention for that  We will admit for chest pain rule out   Lopressor given for sinus tachycardia      Critical Care Time  Procedures

## 2022-07-05 NOTE — DISCHARGE SUMMARY
[address]  Discharge- Gerard Weber 1934, 80 y o  female MRN: [id number]  Unit/Bed#: E5 -01 Encounter: [id number]  Primary Care Provider: No primary care provider on file  Date and time admitted to hospital: 7/4/2022  7:54 PM    Assessment:  Chest Pains  HTN  Dementia  CKD Stage 3/4    Discharging Physician / Practitioner: Meagan Dailey MD  PCP: No primary care provider on file  Admission Date:   Admission Orders (From admission, onward)     Ordered        07/05/22 0039  Place in Observation  Once                      Discharge Date: 07/05/22    Medical Problems             Resolved Problems  Date Reviewed: 7/5/2022   None                 Consultations During Hospital Stay:  · Cardiology    Procedures Performed:   · none    Significant Findings / Test Results:   X-ray chest 2 views    Result Date: 7/5/2022  Impression: No active pulmonary disease  Moderate to severe compression deformity of a lower thoracic vertebral body  See the follow up chest CT report of same day for further details  Workstation performed: [id number]     CTA dissection protocol chest/abdomen/pelvis    Result Date: 7/4/2022  · Impression: No evidence of acute aortic pathology  Atherosclerotic changes with focal noncalcified atheromatous plaque versus? Penetrating atheromatous ulcer at the mid descending thoracic segment  Age indeterminate compression fracture deformity at T11 with near total loss of vertebral body height and mild associated bony retropulsion  Correlate clinically and with prior outside imaging if available  Mild nonspecific biliary and pancreatic ductal dilatation without clear evidence of choledocholithiasis  Consider nonemergent follow-up MRI/MRCP as clinically warranted  Additional findings as above  Study was marked in Epic for significant notification  Workstation performed: [id number]     2D Echo 7/5/22  1   Mild to moderate concentric left ventricular hypertrophy with prominent basal and mid interventricular septum, normal left ventricular systolic function, grade 1 diastolic dysfunction  Ejection fraction is estimated as around 58%  2  Normal right ventricular size and systolic function  3  Mild left atrial cavity enlargement  4  Aortic valve sclerosis with focal calcification and leaflet thickening and reduced cuspal separation  Mild to moderate aortic valve stenosis  No aortic valve regurgitation  Peak transaortic velocity is 2 4 m/sec, mean gradient is 12 mmHg, calculated aortic valve area is 1 4-1 8 cm2   5  Mitral annular calcification and mitral valve leaflet thickening  Mild to moderate mitral valve regurgitation  6  Mild tricuspid and pulmonic valve regurgitation  7  Suspected mild pulmonary hypertension  Estimated RVSP/PASP is around 37 mmHg  8  No pericardial effusion  Incidental Findings:   · none     Test Results Pending at Discharge (will require follow up):   · none     Outpatient Tests Requested:  · none    Complications:  none    Reason for Admission: CP    Hospital Course:     Rachael Kruger is a 80 y o  female patient who originally presented to the hospital on 7/4/2022 due to chest pains  Patient noted to have chest pain, epigastric pain with initial troponin elevated  CTA chest ruled out dissection, but there was a noted atheromatous plaque in the transcending aorta penetrating ulcer  It was discussed with surgery, plaque/ulcer was likely not contributing to her symptoms  Patient's troponins were trended, she had mm elevated troponins  Cardiology was consulted recommending 2D echo  2D echo was completed showing no significant valvular, wall motion abnormality with normal EF  It was noted to have some small to moderate aortic stenosis  Cardiology does recommend bisoprolol 2 5 mg once daily on discharge  No further cardiac testing at this time  Please see above list of diagnoses and related plan for additional information  Condition at Discharge: fair     Discharge Day Visit / Exam:     Subjective:  Patient seen examined today at bedside  According to family, patient is at baseline  No complaints of any chest or abdominal pain  Vitals: Blood Pressure: 149/86 (07/05/22 1130)  Pulse: (!) 110 (07/05/22 1130)  Temperature: 99 4 °F (37 4 °C) (07/05/22 0816)  Temp Source: Oral (07/04/22 2005)  Respirations: 18 (07/05/22 0139)  Height: 4' 11" (149 9 cm) (07/05/22 1130)  Weight - Scale: 52 6 kg (116 lb) (07/05/22 1130)  SpO2: 96 % (07/05/22 0816)  Exam:   Physical Exam  Vitals and nursing note reviewed  Constitutional:       Appearance: She is ill-appearing  HENT:      Head: Normocephalic and atraumatic  Eyes:      General: No scleral icterus  Conjunctiva/sclera: Conjunctivae normal    Cardiovascular:      Rate and Rhythm: Regular rhythm  Bradycardia present  Heart sounds: Murmur heard  Pulmonary:      Effort: Pulmonary effort is normal  No respiratory distress  Breath sounds: No wheezing  Abdominal:      General: Bowel sounds are normal  There is no distension  Palpations: Abdomen is soft  Tenderness: There is no abdominal tenderness  Musculoskeletal:         General: No swelling  Right lower leg: No edema  Left lower leg: No edema  Skin:     General: Skin is warm and dry  Neurological:      General: No focal deficit present  Mental Status: She is alert  Mental status is at baseline  She is disoriented  Discussion with Family: patient, daughter at bedside    Discharge instructions/Information to patient and family:   See after visit summary for information provided to patient and family  Provisions for Follow-Up Care:  See after visit summary for information related to follow-up care and any pertinent home health orders  Disposition:     Home    Planned Readmission: none     Discharge Statement:  I spent 35 minutes discharging the patient   This time was spent on the day of discharge  I had direct contact with the patient on the day of discharge  Greater than 50% of the total time was spent examining patient, answering all patient questions, arranging and discussing plan of care with patient as well as directly providing post-discharge instructions  Additional time then spent on discharge activities  Discharge Medications:  See after visit summary for reconciled discharge medications provided to patient and family        ** Please Note: This note has been constructed using a voice recognition system **

## 2022-07-05 NOTE — PLAN OF CARE
Problem: Potential for Falls  Goal: Patient will remain free of falls  Description: INTERVENTIONS:  - Educate patient/family on patient safety including physical limitations  - Instruct patient to call for assistance with activity   - Consult OT/PT to assist with strengthening/mobility   - Keep Call bell within reach  - Keep bed low and locked with side rails adjusted as appropriate  - Keep care items and personal belongings within reach  - Initiate and maintain comfort rounds  - Make Fall Risk Sign visible to staff  - Offer Toileting every 2 Hours, in advance of need  - Initiate/Maintain bed alarm  - Obtain necessary fall risk management equipment: yellow sock  - Apply yellow socks and bracelet for high fall risk patients  - Consider moving patient to room near nurses station  Outcome: Progressing     Problem: MOBILITY - ADULT  Goal: Maintain or return to baseline ADL function  Description: INTERVENTIONS:  -  Assess patient's ability to carry out ADLs; assess patient's baseline for ADL function and identify physical deficits which impact ability to perform ADLs (bathing, care of mouth/teeth, toileting, grooming, dressing, etc )  - Assess/evaluate cause of self-care deficits   - Assess range of motion  - Assess patient's mobility; develop plan if impaired  - Assess patient's need for assistive devices and provide as appropriate  - Encourage maximum independence but intervene and supervise when necessary  - Involve family in performance of ADLs  - Assess for home care needs following discharge   - Consider OT consult to assist with ADL evaluation and planning for discharge  - Provide patient education as appropriate  Outcome: Progressing  Goal: Maintains/Returns to pre admission functional level  Description: INTERVENTIONS:  - Perform BMAT or MOVE assessment daily    - Set and communicate daily mobility goal to care team and patient/family/caregiver     - Collaborate with rehabilitation services on mobility goals if consulted  - Perform Range of Motion 3 times a day  - Reposition patient every 2 hours    - Dangle patient 3 times a day  - Stand patient 3 times a day  - Ambulate patient 3 times a day  - Out of bed to chair 3 times a day   - Out of bed for meals 3 times a day  - Out of bed for toileting  - Record patient progress and toleration of activity level   Outcome: Progressing     Problem: PAIN - ADULT  Goal: Verbalizes/displays adequate comfort level or baseline comfort level  Description: Interventions:  - Encourage patient to monitor pain and request assistance  - Assess pain using appropriate pain scale  - Administer analgesics based on type and severity of pain and evaluate response  - Implement non-pharmacological measures as appropriate and evaluate response  - Consider cultural and social influences on pain and pain management  - Notify physician/advanced practitioner if interventions unsuccessful or patient reports new pain  Outcome: Progressing     Problem: INFECTION - ADULT  Goal: Absence or prevention of progression during hospitalization  Description: INTERVENTIONS:  - Assess and monitor for signs and symptoms of infection  - Monitor lab/diagnostic results  - Monitor all insertion sites, i e  indwelling lines, tubes, and drains  - Monitor endotracheal if appropriate and nasal secretions for changes in amount and color  - [place] appropriate cooling/warming therapies per order  - Administer medications as ordered  - Instruct and encourage patient and family to use good hand hygiene technique  - Identify and instruct in appropriate isolation precautions for identified infection/condition  Outcome: Progressing  Goal: Absence of fever/infection during neutropenic period  Description: INTERVENTIONS:  - Monitor WBC    Outcome: Progressing

## 2022-07-05 NOTE — ASSESSMENT & PLAN NOTE
poa by tachycardia/tachypnea/leucocytosis  Unclear etiology  Possibly related to stomach pain now improved  Sinus tach  No dissection on cta dissection study  Age adjusted ddimer wnl, no s/sx of acute infection, mildly dilated biliary tree at pancreatic duct/biliary duct w/o over stone  ivf hydration  Add on lipase/lfts stat    Monitor off abx unless develops fever  Repeat cbc/cmp/lipase in am

## 2022-07-05 NOTE — CONSULTS
ENCOUNTER DATE: 07/05/22 8:30 AM  PATIENT NAME: Alexey Rojo   1934    [id number]  Age: 80 y o  Sex: female  [address]: Mishel Luque MD  INPATIENT ATTENDING PHYSICIAN: Nelson Altamirano, *; PRIMARYCARE PHYSICIAN: No primary care provider on file  DATE OF ADMISSION: 7/4/2022  7:54 PM; LENGTH OF STAY: 0 days  *-*-*-*-*-*-*-*-*-*-*-*-*-*-*-*-*-*-*-*-*-*-*-*-*-*-*-*-*-*-*-*-*-*-*-*-*-*-*-*-*-*-*-*-*-*-*-*-*-*-*-*-*-*-   REASON FOR CONSULTATION:   Elevated troponin  Chest pain    *-*-*-*-*-*-*-*-*-*-*-*-*-*-*-*-*-*-*-*-*-*-*-*-*-*-*-*-*-*-*-*-*-*-*-*-*-*-*-*-*-*-*-*-*-*-*-*-*-*-*-*-*-*-  CARDIAC ASSESSMENT:     1  Atypical chest pain  2  Elevated troponin, rule out type 2 MI secondary to demand ischemia versus non MI troponin elevation  3  Primary hypertension  4  Systemic inflammatory response  5  Suspected UTI  6  Acute kidney injury superimposed over chronic kidney disease  7  Alzheimers dementia  8  Degenerative joint disease w chronic back pain       Patient Active Problem List   Diagnosis    Chest pain    SIRS (systemic inflammatory response syndrome) (HCC)    Confusion    HTN (hypertension)    Chronic kidney disease        Patient has presented with atypical chest pain  She is noted to have mild interval increase in troponin  She has chronic sinus tachycardia  She had both troponin elevation sinus tachycardia during recent hospitalization [address]  Currently she is chest pain-free  Exam does not reveal signs of pulmonary or peripheral vascular congestion  CARDIAC PLAN:     -- will request echocardiogram to assess cardiac structure and function  -- request ambulation of patient with physical therapy assistance  If she is chest pain-free and echocardiogram is unremarkable then she will be able to be discharged home    Patient's daughter informs that patient has symptoms related to vaginal prolapse and is scheduled to follow-up with urology Darlene Queen tomorrow and she would like to keep the appointment  -- continued hydration and encouragement to patient to take plenty of fluids  Will review echocardiogram prior to discharge  *-*-*-*-*-*-*-*-*-*-*-*-*-*-*-*-*-*-*-*-*-*-*-*-*-*-*-*-*-*-*-*-*-*-*-*-*-*-*-*-*-*-*-*-*-*-*-*-*-*-*-*-*-*-  HISTORY OF PRESENT ILLNESS     Patient is a 80 yr old female  of [facility] origin with prior medical history is significant for:  1  Primary hypertension  2  Dyslipidemia  3  History of CVA  4  Alzheimer's dementia with some cognitive impairment  5  Degenerative joint disease    Patient is a resident of [facility] and is currently visiting her family here  She has presented to emergency room with 1 day history of substernal chest pain and some anxiety at home  In the emergency room patient was noted to be tachycardic and tachypneic  Her initial ECG showed sinus tachycardia at 110 beats per minute  Her laboratory evaluation was significant for elevated BUN creatinine, elevated white cell count, decrease hemoglobin and hematocrit, elevated D-dimer and high sensitivity troponin initial normal high sensitivity troponin of 11 with subsequent rise to 66 at 4:00 a m  Amaury Kaur Patient's symptoms had resolved in the emergency room  Given elevated D-dimer she underwent a CTA of the chest and this was significant for presence of atherosclerotic changes in the aorta with 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 along with vertebral compression fractures  There was no evidence of pulmonary embolism  There were calcific changes noted in the coronary arteries  Patient is a poor historian due to her dementia and in is unable to provide history  History is obtained speaking to family members including her daughter who is in the room  As she primarily speaks Swedish,  encounter is performed with use of [employer]  telephone translation service (  [id number] )    According to patient's daughter patient complains of intermittent chest pain especially when she is anxious  Yesterday she was at home and she appeared very anxious and started complaining of pain  She was not able to describe the nature of the pain  Symptoms persisted for several hours until she came to the emergency room  Review of medical record indicates that patient was recently hospitalized at [facility] in [place] between June 16, 2022 and June 20, 2022  She had urinary tract infection at that time  During the hospitalization she was also noted to be in sinus tachycardia rhythm and had elevated troponin and NT proBNP level  Overnight patient has been chest pain-free but has been tachycardic her oxygen saturation has been normal on room air  Her blood pressure has been normal   She has been afebrile  Patient has no prior history of coronary artery disease or congestive heart failure or arrhythmias in the past     Patient's daiughter mentions that her mom has intermittently c/o chest pain whenever she becomes agitated as she was yesterday  *-*-*-*-*-*-*-*-*-*-*-*-*-*-*-*-*-*-*-*-*-*-*-*-*-*-*-*-*-*-*-*-*-*-*-*-*-*-*-*-*-*-*-*-*-*-*-*-*-*-*-*-*-*  PAST MEDICAL HISTORY:     Past Medical History:   Diagnosis Date    HLD (hyperlipidemia)     HTN (hypertension)     PAST SURGICAL HISTORY     No past surgical history on file  FAMILY HISTORY     No family history on file    SOCIAL HISTORY     Social History     Tobacco Use   Smoking Status Not on file   Smokeless Tobacco Not on file      Social History     Substance and Sexual Activity   Alcohol Use Not on file     Social History     Substance and Sexual Activity   Drug Use Not on file    [unfilled]     *-*-*-*-*-*-*-*-*-*-*-*-*-*-*-*-*-*-*-*-*-*-*-*-*-*-*-*-*-*-*-*-*-*-*-*-*-*-*-*-*-*-*-*-*-*-*-*-*-*-*-*-*-*  ALLERGIES     No Known Allergies  CURRENT SCHEDULED MEDICATIONS       Current Facility-Administered Medications:     acetaminophen (TYLENOL) tablet 650 mg, 650 mg, Oral, Q6H PRN, Karina Person PA-C    amLODIPine (NORVASC) tablet 10 mg, 10 mg, Oral, Daily, Karina Person PA-C, 10 mg at 07/05/22 0817    clopidogrel (PLAVIX) tablet 75 mg, 75 mg, Oral, Daily, Karina Person PA-C, 75 mg at 07/05/22 0817    heparin (porcine) subcutaneous injection 5,000 Units, 5,000 Units, Subcutaneous, Q8H [address], 5,000 Units at 07/05/22 8912 **AND** Platelet count, , , Once, Karina Person PA-C    HYDROmorphone HCl (DILAUDID) injection 0 2 mg, 0 2 mg, Intravenous, Q4H PRN, Karina Person PA-C    lidocaine (LIDODERM) 5 % patch 1 patch, 1 patch, Topical, Once, Illona Buerger, MD, 1 patch at 07/04/22 2055    memantine (NAMENDA) tablet 20 mg, 20 mg, Oral, Daily, Karina Person PA-C, 20 mg at 07/05/22 0817    OLANZapine (ZyPREXA) tablet 5 mg, 5 mg, Oral, Q6H PRN, Erica Butcher MD, 5 mg at 07/05/22 0816    ondansetron (ZOFRAN) injection 4 mg, 4 mg, Intravenous, Q6H PRN, Karina Person PA-C    oxyCODONE (ROXICODONE) IR tablet 2 5 mg, 2 5 mg, Oral, Q4H PRN, Karina Person PA-C    pravastatin (PRAVACHOL) tablet 80 mg, 80 mg, Oral, Daily With Dinner, Karina Person PA-C    QUEtiapine (SEROquel) tablet 12 5 mg, 12 5 mg, Oral, HS, Karina Person PA-C, 12 5 mg at 07/05/22 0343    QUEtiapine (SEROquel) tablet 50 mg, 50 mg, Oral, Daily, Karina Person PA-C, 50 mg at 07/05/22 0816    Insert peripheral IV, , , Once **AND** sodium chloride (PF) 0 9 % injection 3 mL, 3 mL, Intravenous, Q1H PRN, Sobia Mckenzie MD    sodium chloride 0 9 % infusion, 75 mL/hr, Intravenous, Continuous, Karina Person PA-C, Last Rate: 75 mL/hr at 07/05/22 0153, 75 mL/hr at 07/05/22 0153     *-*-*-*-*-*-*-*-*-*-*-*-*-*-*-*-*-*-*-*-*-*-*-*-*-*-*-*-*-*-*-*-*-*-*-*-*-*-*-*-*-*-*-*-*-*-*-*-*-*-*-*-*-*   REVIEW OF SYSTEMS     Positive for:  As noted above in HPI  Negative for: All remaining as reviewed below and in HPI   SYSTEM SYMPTOMS REVIEWED:  General--weight change, fever, night sweats  Respiratoryl-- Wheezing, shortness of breath, cough, URI symptoms, sputum, blood  Cardiovascular--chest pain, syncope, dyspnea on exertion, edema, decline in exercise tolerance, claudication   Gastrointestinal--persistent vomiting, diarrhea, abdominal distention, blood in stool   Muscular or skeletal--joint pain or swelling   Neurologic--headaches, syncope, abnormal movement  Hematologic--history of easy bruising and bleeding   Endocrine--thyroid enlargement, heat or cold intolerance, polyuria   Psychiatric--anxiety, depression      *-*-*-*-*-*-*-*-*-*-*-*-*-*-*-*-*-*-*-*-*-*-*-*-*-*-*-*-*-*-*-*-*-*-*-*-*-*-*-*-*-*-*-*-*-*-*-*-*-*-*-*-*-*-   VITAL SIGNS     Vitals:    22 0030 22 0115 22 0139 22 0816   BP: 147/70  108/82 149/86   BP Location: Left arm      Pulse: 102 (!) 120 (!) 112 (!) 117   Resp: (!) 23  18    Temp:   98 3 °F (36 8 °C) 99 4 °F (37 4 °C)   TempSrc:       SpO2: 97%  95% 96%       TEMPERATURE HISTORY 24H: Temp (24hrs), Av 5 °F (36 9 °C), Min:97 9 °F (36 6 °C), Max:99 4 °F (37 4 °C)     BLOOD PRESSURE HISTORY: Systolic (69UPI), ACT:146 , Min:108 , ZWI:921    Diastolic (03NVA), PYQ:75, Min:56, Max:86      WEIGHTS:   Wt Readings from Last 25 Encounters:   No data found for Wt        BMI: There is no height or weight on file to calculate BMI  I&O:     Intake/Output Summary (Last 24 hours) at 2022 0830  Last data filed at 2022 0153  Gross per 24 hour   Intake 2490 ml   Output 400 ml   Net 2090 ml      *-*-*-*-*-*-*-*-*-*-*-*-*-*-*-*-*-*-*-*-*-*-*-*-*-*-*-*-*-*-*-*-*-*-*-*-*-*-*-*-*-*-*-*-*-*-*-*-*-*-*-*-*-*-   PHYSICAL EXAMINATION:     General Appearance:    Alert, cooperative, no distress, appears stated age    Head, Eyes, ENT:    No obvious abnormality, moist mucous mebranes  Neck:   Supple, no carotid bruit or JVD   Back:     Symmetric, no curvature  Lungs:     Respirations unlabored   Clear to auscultation bilaterally,    Chest wall: reports some tenderness to palpation in anterior chest   Has no deformity   Heart:    Regular rate and rhythm, tachycardic, slightly loud 2nd heart sound, grade 1/6 systolic murmur possibly due to hyperdynamic LVF  Abdomen:     Soft, non-tender, No obvious masses, or organomegaly   Extremities:   Extremities warm, no cyanosis or edema    Skin:   Skin color, texture, turgor normal, no rashes or lesions     *-*-*-*-*-*-*-*-*-*-*-*-*-*-*-*-*-*-*-*-*-*-*-*-*-*-*-*-*-*-*-*-*-*-*-*-*-*-*-*-*-*-*-*-*-*-*-*-*-*-*-*-*-*-   LABORATORY DATA:    I have personally reviewed the available laboratory data  CMP:  Results from last 7 days   Lab Units 07/05/22  0620 07/04/22 2253 07/04/22 2037   SODIUM mmol/L 142  --  145   POTASSIUM mmol/L 4 3  --  4 5   CHLORIDE mmol/L 108  --  108   CO2 mmol/L 22  --  23   BUN mg/dL 30*  --  42*   CREATININE mg/dL 1 54*  --  1 98*   CALCIUM mg/dL 8 6  --  8 9   ALK PHOS U/L 102 92  --    ALT U/L 23 17  --    AST U/L 17 13  --                Cardiac Profile:       Invalid input(s): CK, CKMBP             CBC:   Results from last 7 days   Lab Units 07/05/22  0620 07/04/22 2037   WBC Thousand/uL 10 37* 12 51*   HEMOGLOBIN g/dL 8 5* 9 7*   HEMATOCRIT % 25 2* 28 6*   PLATELETS Thousands/uL 454* 537*     PT/INR: No results found for: PT, INR, Microbiology:          *-*-*-*-*-*-*-*-*-*-*-*-*-*-*-*-*-*-*-*-*-*-*-*-*-*-*-*-*-*-*-*-*-*-*-*-*-*-*-*-*-*-*-*-*-*-*-*-*-*-*-*-*-*-   RADIOLOGY RESULTS:     CTA dissection protocol chest/abdomen/pelvis    Result Date: 7/4/2022  Impression: No evidence of acute aortic pathology  Atherosclerotic changes with focal noncalcified atheromatous plaque versus? Penetrating atheromatous ulcer at the mid descending thoracic segment  Age indeterminate compression fracture deformity at T11 with near total loss of vertebral body height and mild associated bony retropulsion  Correlate clinically and with prior outside imaging if available   Mild nonspecific biliary and pancreatic ductal dilatation without clear evidence of choledocholithiasis  Consider nonemergent follow-up MRI/MRCP as clinically warranted  Additional findings as above  Study was marked in Epic for significant notification  Workstation performed: [id number]       *-*-*-*-*-*-*-*-*-*-*-*-*-*-*-*-*-*-*-*-*-*-*-*-*-*-*-*-*-*-*-*-*-*-*-*-*-*-*-*-*-*-*-*-*-*-*-*-*-*-*-*-*-*-    LAST ECG, ECHOCARDIOGRAM AND OTHER CARDIOLOGY TEST RESULTS:     Results for orders placed or performed during the hospital encounter of 07/04/22   ECG 12 lead   Result Value    Ventricular Rate 101    Atrial Rate 101    MI Interval 162    QRSD Interval 70    QT Interval 344    QTC Interval 446    P Axis 73    QRS Axis -2    T Wave Axis 20    Narrative    Sinus tachycardia  Poor anterior R wave progression is noted  Otherwise normal ECG  Confirmed by Trenton Saldaña ([id number]) on 7/5/2022 6:40:33 AM    No results found for this or any previous visit  No results found for this or any previous visit  No results found for this or any previous visit  No results found for this or any previous visit  *-*-*-*-*-*-*-*-*-*-*-*-*-*-*-*-*-*-*-*-*-*-*-*-*-*-*-*-*-*-*-*-*-*-*-*-*-*-*-*-*-*-*-*-*-*-*-*-*-*-*-*-*-*-  SIGNATURES:   @BWV@   Daina Rivas MD     CC: No primary care provider on file  No ref   provider found

## 2022-07-05 NOTE — H&P
[address] [facility]  H&P- Gib Sukumar 1934, 80 y o  female MRN: [id number]  Unit/Bed#: E5 -01 Encounter: [id number]  Primary Care Provider: No primary care provider on file  Date and time admitted to hospital: 7/4/2022  7:54 PM    * Chest pain  Assessment & Plan  Cp/abd pain  Was described in ed as radiating to back  Pt presently confused saying it radiates to chest or does not radiate at all and is now improved  -Dissection study negative for acute pathology, shows diffuse atherosclerotic plaque in descending thoraric aorta vs penetrating atheromatous ulcer at the mid descending thoracic segment  As well as age indeterminant T11 vertebral fx  -serial troponins with peak at 66 but no ischemic changes on ekg  Suspect non mi troponin I elevation in setting of ckd and sirs  -random trop in am monitor on telemetry  -ivf for renal function given ckd  -if troponin I continues to elevate consider cardiology evaluation    Chronic kidney disease  Assessment & Plan  Lab Results   Component Value Date    EGFR 22 07/04/2022    CREATININE 1 98 (H) 07/04/2022     Baseline appears to be 1 5-1 8 now 1 98  ivf and hold cozaar given contrast load to avoid nadine    HTN (hypertension)  Assessment & Plan  Continue norvasc hold losartan given contrast load for dissection study      Confusion  Assessment & Plan  Suspect sundowning/cognitive impairment vs metabolic encephalopathy 2* sirs  Presently alert and oriented to self, moves all 4 extremities  Continue to monitor/reorient  Called emergency contact to clarify baseline, VM left    SIRS (systemic inflammatory response syndrome) ([facility] [address] )  Assessment & Plan  poa by tachycardia/tachypnea/leucocytosis  Unclear etiology  Possibly related to stomach pain now improved  Sinus tach  No dissection on cta dissection study  Age adjusted ddimer wnl, no s/sx of acute infection, mildly dilated biliary tree at pancreatic duct/biliary duct w/o over stone      ivf hydration  Add on lipase/lfts stat  Monitor off abx unless develops fever  Repeat cbc/cmp/lipase in am      VTE Prophylaxis: Heparin  / sequential compression device   Code Status: fc  POLST: There is no POLST form on file for this patient (pre-hospital)  Discussion with family: called emergency contact granddaughter  MARINO tapia  Anticipated Length of Stay:  Patient will be admitted on an Observation basis with an anticipated length of stay of  Less than 2 midnights  Justification for Hospital Stay: cp acs r/o    Total Time for Visit, including Counseling / Coordination of Care: 45 minutes  Greater than 50% of this total time spent on direct patient counseling and coordination of care  Chief Complaint:   Cp/abd pain    History of Present Illness:    Kassy Drake is a 80 y o  female who presents with pmh of htn hld dementia per review of care everywhere, hx of cva coming to hospital for cp/abd pain  Pt Italian speaking only  Conversation occurred between the pt/myself in [facility] as well with nursing staff at bedside  Pt is a poor historian and is confused  She is oriented to self  She notes midline cp/epigastric abd pain  Previously in ed she reported this went to her back  When asked duration of s/sx she reports she had these s/sx but are now better and has trouble elucidating duration of s/sx  She reports this was like 1-2 days  Had nausea but no vomiting /diarrhea  She feels well now  She does note some back pain but reports this is presently fine  She continues to ask where her  is despite redirection  In ed she was evaluated by cta dissection study  There was atheromatous plaque in descending T aorta vs penetrating ulcer  Case was d/w ct surgery who felt unlikely related to patient's s/sx  Given advanced age cp for acs rule out was requested  Review of Systems:    Review of Systems   Cardiovascular: Positive for chest pain     Gastrointestinal: Positive for abdominal pain and nausea  Past Medical and Surgical History:     Past Medical History:   Diagnosis Date    HLD (hyperlipidemia)     HTN (hypertension)        No past surgical history on file  Meds/Allergies:    Prior to Admission medications    Medication Sig Start Date End Date Taking? Authorizing Provider   amLODIPine (NORVASC) 10 mg tablet Take 10 mg by mouth daily 6/21/22  Yes Historical Provider, MD   clopidogrel (PLAVIX) 75 mg tablet Take 75 mg by mouth daily 6/21/22  Yes Historical Provider, MD   Diclofenac Sodium (VOLTAREN) 1 % Apply 4 g topically 2 (two) times a day 6/27/22 9/25/22 Yes Historical Provider, MD   diphenhydrAMINE-acetaminophen (TYLENOL PM)  MG TABS Take 1 tablet by mouth daily at bedtime as needed for sleep   Yes Historical Provider, MD   hydrocortisone 2 5 % cream Apply to anus 3-4 times a day as needed for pain  6/27/22 7/7/22 Yes Historical Provider, MD   losartan (COZAAR) 100 MG tablet Take 100 mg by mouth daily 6/21/22  Yes Historical Provider, MD   memantine (NAMENDA) 10 mg tablet Take 20 mg by mouth daily 6/21/22  Yes Historical Provider, MD   psyllium (METAMUCIL) 58 6 % packet Take 3 4 g by mouth daily 6/21/22 4/17/23 Yes Historical Provider, MD   QUEtiapine (SEROquel) 25 mg tablet Take 50 mg by mouth daily 6/21/22 9/19/22 Yes Historical Provider, MD   rosuvastatin (CRESTOR) 10 MG tablet Take 10 mg by mouth daily 6/21/22  Yes Historical Provider, MD     I have reviewed home medications with patient personally  Allergies: No Known Allergies    Social History:     Marital Status:     Occupation:   Patient Pre-hospital Living Situation:   Patient Pre-hospital Level of Mobility:   Patient Pre-hospital Diet Restrictions:   Substance Use History:   Social History     Substance and Sexual Activity   Alcohol Use Not on file     Social History     Tobacco Use   Smoking Status Not on file   Smokeless Tobacco Not on file     Social History     Substance and Sexual Activity   Drug Use Not on file       Family History:    unable to assess    Physical Exam:     Vitals:   Blood Pressure: 108/82 (07/05/22 0139)  Pulse: (!) 112 (07/05/22 0139)  Temperature: 98 3 °F (36 8 °C) (07/05/22 0139)  Temp Source: Oral (07/04/22 2005)  Respirations: 18 (07/05/22 0139)  SpO2: 95 % (07/05/22 0139)    Physical Exam  Vitals reviewed  Constitutional:       General: She is not in acute distress  Appearance: She is not ill-appearing, toxic-appearing or diaphoretic  HENT:      Head: Normocephalic and atraumatic  Right Ear: External ear normal       Left Ear: External ear normal       Nose: Nose normal    Eyes:      Extraocular Movements: Extraocular movements intact  Cardiovascular:      Rate and Rhythm: Regular rhythm  Tachycardia present  Heart sounds: No murmur heard  No friction rub  No gallop  Pulmonary:      Effort: No respiratory distress  Breath sounds: No wheezing, rhonchi or rales  Abdominal:      General: There is no distension  Palpations: Abdomen is soft  There is no mass  Tenderness: There is no abdominal tenderness  There is no guarding or rebound  Hernia: No hernia is present  Musculoskeletal:      Right lower leg: No edema  Left lower leg: No edema  Neurological:      Mental Status: She is alert  Comments: Alert and oriented to self  Moves all 4 limbs  (  Be Sure to Include Physical Exam: Delete this entire line when you have entered your exam)    Additional Data:     Lab Results: I have personally reviewed pertinent reports        Results from last 7 days   Lab Units 07/04/22 2037   WBC Thousand/uL 12 51*   HEMOGLOBIN g/dL 9 7*   HEMATOCRIT % 28 6*   PLATELETS Thousands/uL 537*   NEUTROS PCT % 65   LYMPHS PCT % 28   MONOS PCT % 7   EOS PCT % 0     Results from last 7 days   Lab Units 07/04/22  2253 07/04/22 2037   SODIUM mmol/L  --  145   POTASSIUM mmol/L  --  4 5   CHLORIDE mmol/L  --  108   CO2 mmol/L  --  23   BUN mg/dL  --  42* CREATININE mg/dL  --  1 98*   ANION GAP mmol/L  --  14*   CALCIUM mg/dL  --  8 9   ALBUMIN g/dL 3 1*  --    TOTAL BILIRUBIN mg/dL 0 22  --    ALK PHOS U/L 92  --    ALT U/L 17  --    AST U/L 13  --    GLUCOSE RANDOM mg/dL  --  156*                       Imaging: I have personally reviewed pertinent reports  CTA dissection protocol chest/abdomen/pelvis   Final Result by Tremayne Munguia MD (07/04 9812)      No evidence of acute aortic pathology  Atherosclerotic changes with focal noncalcified atheromatous plaque versus? Penetrating atheromatous ulcer at the mid descending thoracic segment  Age indeterminate compression fracture deformity at T11 with near total loss of vertebral body height and mild associated bony retropulsion  Correlate clinically and with prior outside imaging if available  Mild nonspecific biliary and pancreatic ductal dilatation without clear evidence of choledocholithiasis  Consider nonemergent follow-up MRI/MRCP as clinically warranted  Additional findings as above  Study was marked in Epic for significant notification  Workstation performed: [id number]         X-ray chest 2 views    (Results Pending)       EKG, Pathology, and Other Studies Reviewed on Admission:   · EKG: sinus tach w/no twi in 2+ contiguous leads    Allscripts / Epic Records Reviewed: Yes     ** Please Note: This note has been constructed using a voice recognition system   **

## 2022-08-24 ENCOUNTER — OFFICE VISIT (OUTPATIENT)
Dept: FAMILY MEDICINE CLINIC | Facility: CLINIC | Age: 87
End: 2022-08-24

## 2022-08-24 VITALS
HEART RATE: 112 BPM | DIASTOLIC BLOOD PRESSURE: 86 MMHG | WEIGHT: 118.8 LBS | SYSTOLIC BLOOD PRESSURE: 144 MMHG | OXYGEN SATURATION: 98 % | HEIGHT: 59 IN | RESPIRATION RATE: 20 BRPM | TEMPERATURE: 96.4 F | BODY MASS INDEX: 23.95 KG/M2

## 2022-08-24 DIAGNOSIS — R32 URINARY INCONTINENCE, UNSPECIFIED TYPE: ICD-10-CM

## 2022-08-24 DIAGNOSIS — R15.1 FECAL SMEARING: ICD-10-CM

## 2022-08-24 DIAGNOSIS — G30.9 ALZHEIMER'S DEMENTIA WITHOUT BEHAVIORAL DISTURBANCE, UNSPECIFIED TIMING OF DEMENTIA ONSET: ICD-10-CM

## 2022-08-24 DIAGNOSIS — N81.10 VAGINAL PROLAPSE: ICD-10-CM

## 2022-08-24 DIAGNOSIS — F02.80 ALZHEIMER'S DEMENTIA WITHOUT BEHAVIORAL DISTURBANCE, UNSPECIFIED TIMING OF DEMENTIA ONSET: ICD-10-CM

## 2022-08-24 DIAGNOSIS — R07.9 CHEST PAIN: ICD-10-CM

## 2022-08-24 DIAGNOSIS — Z59.89 DOES NOT HAVE HEALTH INSURANCE: Primary | ICD-10-CM

## 2022-08-24 DIAGNOSIS — Z74.2 NEED FOR HOME HEALTH CARE: ICD-10-CM

## 2022-08-24 DIAGNOSIS — I10 PRIMARY HYPERTENSION: ICD-10-CM

## 2022-08-24 PROCEDURE — 99204 OFFICE O/P NEW MOD 45 MIN: CPT | Performed by: FAMILY MEDICINE

## 2022-08-24 RX ORDER — ROSUVASTATIN CALCIUM 10 MG/1
10 TABLET, COATED ORAL DAILY
Qty: 90 TABLET | Refills: 1 | Status: SHIPPED | OUTPATIENT
Start: 2022-08-24 | End: 2022-10-19 | Stop reason: SDUPTHER

## 2022-08-24 RX ORDER — AMLODIPINE BESYLATE 10 MG/1
10 TABLET ORAL DAILY
Qty: 90 TABLET | Refills: 1 | Status: SHIPPED | OUTPATIENT
Start: 2022-08-24 | End: 2022-10-19 | Stop reason: SDUPTHER

## 2022-08-24 RX ORDER — LOSARTAN POTASSIUM 100 MG/1
100 TABLET ORAL DAILY
Qty: 90 TABLET | Refills: 1 | Status: SHIPPED | OUTPATIENT
Start: 2022-08-24 | End: 2022-10-19 | Stop reason: SDUPTHER

## 2022-08-24 RX ORDER — QUETIAPINE FUMARATE 50 MG/1
50 TABLET, FILM COATED ORAL DAILY
Qty: 90 TABLET | Refills: 1 | Status: SHIPPED | OUTPATIENT
Start: 2022-08-24 | End: 2022-10-14

## 2022-08-24 RX ORDER — BISOPROLOL FUMARATE 5 MG/1
2.5 TABLET, FILM COATED ORAL DAILY
Qty: 30 TABLET | Refills: 2 | Status: SHIPPED | OUTPATIENT
Start: 2022-08-24

## 2022-08-24 SDOH — ECONOMIC STABILITY - INCOME SECURITY: OTHER PROBLEMS RELATED TO HOUSING AND ECONOMIC CIRCUMSTANCES: Z59.89

## 2022-08-24 NOTE — PROGRESS NOTES
Assessment/Plan:    HTN (hypertension)  Currently at goal and manage with amlodipine 10mg and losartan 100mg, and recently bisoprolol 2 5mg , will continue    Dementia (HCC)  Bonnie Carter secondary to a reported diagnosis of alzheimer  Per daugther she was diagnosed about [place] [facility] [place]   Currently there is no associated behavior issues   Currently manage with memantanine 20mg daily and seroquel 25mg at night   daugther asking for geriatric referral as she used to see a geriatrician, referral given     Vaginal prolapse  Chronic  Associated with fecal incontinence and mild urinal incontinence per daugther   Referral to urogyn given     Fecal smearing  Per daugther  No fecal smear noted on patient's brief   referal to urogyn given       Diagnoses and all orders for this visit:    Does not have health insurance  -     Ambulatory Referral to Financial Counseling Program; Future  -     Ambulatory Referral to Social Work Care Management Program; Future    Need for home health care  -     Ambulatory Referral to Social Work Care Management Program; Future    Chest pain  -     bisoprolol (ZEBETA) 5 mg tablet; Take 0 5 tablets (2 5 mg total) by mouth daily    Alzheimer's dementia without behavioral disturbance, unspecified timing of dementia onset (HCC)  -     QUEtiapine (SEROquel) 50 mg tablet; Take 1 tablet (50 mg total) by mouth daily  -     Ambulatory Referral to Senior Care; Future    Primary hypertension  -     rosuvastatin (CRESTOR) 10 MG tablet; Take 1 tablet (10 mg total) by mouth daily  -     losartan (COZAAR) 100 MG tablet; Take 1 tablet (100 mg total) by mouth daily  -     amLODIPine (NORVASC) 10 mg tablet; Take 1 tablet (10 mg total) by mouth daily    Vaginal prolapse  -     Ambulatory Referral to Urogynecology; Future    Fecal smearing  -     Ambulatory Referral to Urogynecology; Future    Urinary incontinence, unspecified type  -     Ambulatory Referral to Urogynecology;  Future         Yoruba theresa :     Subjective:      Patient ID: Maurice Hillman is a 80 y o  female  HPI  Maurice Hillman is a 80 y o  female who present to the office with her daugther to establish care  They recently moved in the area from [facility]  She has a reported past medical history of Alzheimer disease diagnosed in [facility], Cognitive impairment, CVA (cerebral vascular accident), Hip pain, Hyperlipidemia, and Hypertension  They have no acute complaints today   She was recently admitted to the hospital due to chest pain  During that admission troponin were initially found to be elevated  Echocardiogram was performed which showed no acute significant change  CTA was also performed to rule out dissection which showed atheromastous plaque vs atherosmatous ulcer in the mid descending thoracic segment  It also showed mild nonspecific biliary and pancreatic ductal dilation without clear evidence of choledocholithiasis  Patient is not currently complaining of abdominal pain  Cardiology was consulted and they recommended to discharge patient with bisoprolol 2 5mg daily with no further cardiac workup  The following portions of the patient's history were reviewed and updated as appropriate: allergies, current medications, past family history, past medical history, past social history, past surgical history and problem list     Review of Systems   Reason unable to perform ROS: ROS obtained mainly from daugther due to patients dementia    Constitutional: Negative for chills and fever  Respiratory: Negative for cough and shortness of breath  Cardiovascular: Negative for chest pain  Genitourinary: Negative for difficulty urinating, pelvic pain, vaginal discharge and vaginal pain          Vaginal/cervical prolapse         Objective:      /86 (BP Location: Left arm, Patient Position: Sitting, Cuff Size: Standard)   Pulse (!) 112   Temp (!) 96 4 °F (35 8 °C) (Temporal)   Resp 20   Ht 4' 11" (1 499 m) Wt 53 9 kg (118 lb 12 8 oz)   SpO2 98%   BMI 23 99 kg/m²          Physical Exam  Constitutional:       General: She is not in acute distress  Appearance: She is well-developed  She is not toxic-appearing  HENT:      Head: Normocephalic and atraumatic  Eyes:      General: No scleral icterus  Conjunctiva/sclera: Conjunctivae normal    Cardiovascular:      Rate and Rhythm: Normal rate  Heart sounds: Normal heart sounds  No murmur heard  Pulmonary:      Effort: Pulmonary effort is normal  No respiratory distress  Breath sounds: Normal breath sounds  Abdominal:      General: Bowel sounds are normal       Palpations: Abdomen is soft  Tenderness: There is no abdominal tenderness  Genitourinary:     Labia:         Right: No rash, tenderness, lesion or injury  Left: No rash, tenderness, lesion or injury  Vagina: No signs of injury and foreign body  Prolapsed vaginal walls present  No vaginal discharge, erythema, tenderness or lesions  Musculoskeletal:      Cervical back: Neck supple  Skin:     General: Skin is warm and dry  Findings: No rash  Neurological:      Mental Status: She is alert        Comments: Demantia at baseline per daugther, patient act appropriately

## 2022-08-24 NOTE — PATIENT INSTRUCTIONS
Demencia   CUIDADO AMBULATORIO:   Demencia es earnestine enfermedad que provoca la pérdida de memoria y pérdida de control del pensamiento y del juicio  La demencia podría desarrollarse rápidamente en cuestión de unos meses después de sufrir de earnestine lesión en la angelica o un derrame cerebral  Podría desarrollarse lentamente en varios años si usted tiene la enfermedad de Alzheimer  La demencia no tiene [place] y no se puede evitar, kajal el tratamiento podría aminorar o reducir albin síntomas  Los síntomas más comunes [employer] siguientes:  Pérdida de la memoria a corto plazo, seguida de pérdida de la memoria a greg plazo    Dificultad para recordar ir al baño, para orinar o para tener earnestine evacuación intestinal    Enojo o comportamiento violento    Depresión, ansiedad o alucinaciones    Busque atención médica inmediatamente si usted o alguien cercano a usted nota que:  Usted tiene signos de delirio, kaiser confusión extrema, o jermaine o escuchar cosas que no existen  Usted se enoja o se pone violento y no puede calmarse  Usted se desmaya y no lo pueden despertar  Llame a bender médico o pídale a alguien que llame si:  Tiene fiebre  Usted siente más confusión o tiene cambios en el comportamiento o en el estado de ánimo  Usted tiene preguntas o inquietudes acerca de bender condición o cuidado  El tratamiento para la demencia podría incluir medicamentos para retrasar la pérdida de la memoria  Usted también podría necesitar medicamentos para ayudarle a controlar bender enojo, disminuir bender ansiedad o mejorar bender estado de ánimo  Controle la demencia: Usted podría requerir de la presencia de earnestine persona de apoyo para que le ayude a recordar albin tareas diarias  Pídale a bender médico earnestine lista de organizaciones que puedan ayudar  Es mejor planear la visita de earnestine persona de apoyo mientras usted todavía pueda pensar con claridad  Lo siguiente también podría ayudarle a controlar bender demencia:  Mantenga bender mente y cuerpo activos   Briana Cool actividades que usted disfrute, kaiser artes, jardinería o escuchar música  Llame o visite a otras personas con frecuencia  [place] le ayudará a mantener al día albin habilidades sociales y podría ayudarle a reducir la depresión  Tómese todos albin medicamentos kaiser se le indique  [place] le ayudará a controlar enfermedades kaiser la presión arterial amrcus, colesterol alto y diabetes  Escriba albin horarios y rutinas diarios  Apunte albin citas médicas y las horas en que debe tomarse albin medicamentos, las horas de comida, o cualquier otra cosa que necesite recordar  Escriba recordatorios de cuándo debe usar el baño si usted tiene dificultad para recordar  Usted podría necesitar que otra persona escriba las cosas por usted  Coloque relojes y calendarios en lugares visibles  [place] lo ayudará a recordar citas y [employer]  No fume  la nicotina y otras sustancias químicas que contienen los cigarrillos y cigarros pueden dañar los vasos sanguíneos  Pida información a bender médico si usted actualmente fuma y necesita ayuda para dejar de fumar  Los cigarrillos electrónicos o el tabaco sin humo igualmente contienen nicotina  Consulte con bender médico antes de [employer]  Consuma alimentos saludables  Por ejemplo frutas, verduras, panes integrales, productos lácteos bajos en grasa, legumbres, araceli magras y pescado  Pregunte si necesita seguir earnestine dieta especial     Garold Kai a albin consultas de control con bender médico según le indicaron  Pídale a alguien que vaya con usted para que le ayude a recordar lo que le dijo bender médico  La persona que Reeda Soraya con usted puede [place] notas lor la alejandra y luego revisarlas con usted luego  Anote albin preguntas para que se acuerde de hacerlas lor albin visitas  © Copyright [facility] 2022 Information is for End User's use only and may not be sold, redistributed or otherwise used for commercial purposes   All illustrations and images included in CareNotes® are the copyrighted property of A D A M , Inc  [address] es sólo para uso en educación  Bender intención no es darle un consejo médico sobre enfermedades o tratamientos  Colsulte con bender Radha Giron farmacéutico antes de seguir cualquier régimen médico para saber si es seguro y efectivo para usted

## 2022-08-25 ENCOUNTER — PATIENT OUTREACH (OUTPATIENT)
Dept: FAMILY MEDICINE CLINIC | Facility: CLINIC | Age: 87
End: 2022-08-25

## 2022-08-25 DIAGNOSIS — Z59.89 DOES NOT HAVE HEALTH INSURANCE: Primary | ICD-10-CM

## 2022-08-25 SDOH — ECONOMIC STABILITY - INCOME SECURITY: OTHER PROBLEMS RELATED TO HOUSING AND ECONOMIC CIRCUMSTANCES: Z59.89

## 2022-08-25 NOTE — PROGRESS NOTES
AZUCENA RUSSELL received consult from Provider, Dr Kell Head, regarding patient with Alzeheimer's dementia without insurance and need assistance at home  AZUCENA RUSSELL placed call to the phone listed in the chart  Pt's grandson [facility] answered the phone  Introduced self and role  [facility] put his wife Taj Arvizu in the phone  Taj Arvizu reports patient lives with her daughter Harley Pratt's mother  Taj Arvizu reports patient moved from [place], however, she is undocumented and do not have insurance  Taj Arvizu reports patient dementia symptoms are getting worse and is getting difficult for pt's daughter to care for her  AZUCENA RUSSELL reached out to [facility] to see if patient meet the criteria to apply for EMA and Waiver Services  Alepatt explained patient might be eligible for EMA due to medical condition and needing medications, however, she cannot apply for [employer] as patient would need to be a [place] or at least 5 years [address] [facility] [facility] resident  AZUCENA RUSSELL explained the above information to Taj Arvizu verbalized understanding and states AZUCENA RUSSELL to call pt's daughter, Michelle Nowak to apply for the MA  Informs AZUCENA RUSSELL would place a [facility] referral to help her with the process  Taj Arvizu verbalized understanding and thanked AZUCENA RUSSELL for calling  AZUCENA RUSSELL placed [facility] referral  Will remains available and will continue to follow-up

## 2022-08-25 NOTE — ASSESSMENT & PLAN NOTE
Currently at goal and manage with amlodipine 10mg and losartan 100mg, and recently bisoprolol 2 5mg , will continue

## 2022-08-25 NOTE — ASSESSMENT & PLAN NOTE
Cesiaa Dry secondary to a reported diagnosis of alzheimer   Per sharif she was diagnosed about 2 years ago [place] [facility] [place]   Currently there is no associated behavior issues   Currently manage with memantanine 20mg daily and seroquel 25mg at night   sharif asking for geriatric referral as she used to see a geriatrician, referral given

## 2022-08-25 NOTE — ASSESSMENT & PLAN NOTE
Chronic  Associated with fecal incontinence and mild urinal incontinence per daugther   Referral to urogyn given

## 2022-08-30 ENCOUNTER — PATIENT OUTREACH (OUTPATIENT)
Dept: FAMILY MEDICINE CLINIC | Facility: CLINIC | Age: 87
End: 2022-08-30

## 2022-08-30 NOTE — PROGRESS NOTES
Outgoing Call:  8/30/2022    [address] [place] [address] did call pt's daughter, Jeffrey Morejon, to discuss referral received to assist in applying for MA  [address] [place] [address] did introduce herself and her role  CHW assessment was completed and Jeffrey Morejon agreed to services  [address] [place] [address] informed her she could assist in applying and encouraged her to close MA benefits in [facility] as it is still active  Pt will not be approved in [place] with an open case elsewhere  Jeffrey Morejon agreed to call DPW in [place] and close case  [facility] offered to meet with both Jeffrey Morejon and pt later this week to apply for MA       Next outreach is scheduled for 9/1/2022 [facility] [employer] [facility] [place]

## 2022-09-01 ENCOUNTER — PATIENT OUTREACH (OUTPATIENT)
Dept: FAMILY MEDICINE CLINIC | Facility: CLINIC | Age: 87
End: 2022-09-01

## 2022-09-01 NOTE — PROGRESS NOTES
In Person:  9/1/2022    [facility] did meet with pt and her daughter Pablo Mercer at [place] for scheduled appointment  [place] application was completed via [place] [employer] website  Documents are being requested by DPW  [facility] did provide list of what is needed to complete application to Pablo Mercer  She agreed to provide no later than next week  Later in the day [facility] did receive a call from [facility] with Marcelo on the line  DPW CW stated that pt is not eligible for waiver services  [facility] asked how she could assist as she only applied for MA  CW stated that Pablo Mercer informed her pt would receive waiver services  [facility] then informed this was not the case as only an MA application was completed and [facility] informed Pablo Mercer that pt is not a [place] Citizen or legal resident and will not be eligible for waiver services  CW confirmed this is accurate  Pablo Mercer kept pursuing topic and stated that she was told by [facility] DPW that [place] will continue to pay for services  CW once again reiterated that this is not accurate information  Pt is not eligible for waiver services  Pt also did not cancel her MA benefits in [address] to f/u  Next outreach is scheduled for 9/8/2022

## 2022-09-07 ENCOUNTER — TELEPHONE (OUTPATIENT)
Dept: FAMILY MEDICINE CLINIC | Facility: CLINIC | Age: 87
End: 2022-09-07

## 2022-09-07 NOTE — TELEPHONE ENCOUNTER
FORM COMPLETED, SIGNED BY MD/ (INCLUDE [facility]  NUMBER) FAXED ON 09/07/22 TO [place] at [phone]  FAX CONFIRMATION RECEIVED   FORM GIVEN TO [place]IE TO SCAN

## 2022-09-07 NOTE — TELEPHONE ENCOUNTER
IEB FORM RECEIVED ON 9/7/22  GIVEN TO TOBIAS MANJARREZ TO BE COMPLETED, SIGNED BY MD/ (INCLUDE [facility]   NUMBER), AND FAXED BACK IN 3 DAYS

## 2022-09-08 ENCOUNTER — PATIENT OUTREACH (OUTPATIENT)
Dept: FAMILY MEDICINE CLINIC | Facility: CLINIC | Age: 87
End: 2022-09-08

## 2022-09-08 NOTE — PROGRESS NOTES
Francisco Text:  9/8/2022    CMOC did receive a message from Melissa Young, , stating pt's daughter dropped off forms for [facility] to complete MA application  CMOC to review and forward to [facility]  [facility] did complete a chart review and noticed Physician Certification form was completed to apply for waiver services  Pt's daughter was informed by both [facility] and [facility] [facility] that pt is NOT eligible for waiver services as she is undocumented  [facility] will move forward through process of evaluation pt however once case arrives to [facility] for a financial review, pt will be denied services  Next outreach is scheduled for 9/9/2022

## 2022-09-13 ENCOUNTER — PATIENT OUTREACH (OUTPATIENT)
Dept: FAMILY MEDICINE CLINIC | Facility: CLINIC | Age: 87
End: 2022-09-13

## 2022-09-13 NOTE — PROGRESS NOTES
Chart Review / Outgoing Call:  9/13/2022    [facility] did review documents dropped off by [facility] which she requested for [facility] to review  Documents were from [address] [place] [address] stating pt had a waiver evaluation completed and copies of documents completed for pt records  [facility] did call [facility] and explained this to her  [facility] expressed understanding  CMOC did also scan copy of a medical bill provided by [facility] for completion of emergency MA application  Next outreach is scheduled for 9/20/2022

## 2022-09-26 ENCOUNTER — PATIENT OUTREACH (OUTPATIENT)
Dept: FAMILY MEDICINE CLINIC | Facility: CLINIC | Age: 87
End: 2022-09-26

## 2022-09-26 NOTE — PROGRESS NOTES
Chart Review:  9/26/2022    [facility] did check on status of MA application  Application is still being process  CMOC to f/u  Next outreach is scheduled for 10/3/2022

## 2022-09-30 ENCOUNTER — OFFICE VISIT (OUTPATIENT)
Dept: FAMILY MEDICINE CLINIC | Facility: CLINIC | Age: 87
End: 2022-09-30

## 2022-09-30 VITALS
RESPIRATION RATE: 18 BRPM | OXYGEN SATURATION: 98 % | DIASTOLIC BLOOD PRESSURE: 74 MMHG | SYSTOLIC BLOOD PRESSURE: 112 MMHG | HEART RATE: 106 BPM | TEMPERATURE: 97.8 F | BODY MASS INDEX: 24.6 KG/M2 | WEIGHT: 122 LBS | HEIGHT: 59 IN

## 2022-09-30 DIAGNOSIS — R07.9 CHEST PAIN, UNSPECIFIED TYPE: Primary | ICD-10-CM

## 2022-09-30 LAB
SL AMB  POCT GLUCOSE, UA: NORMAL
SL AMB LEUKOCYTE ESTERASE,UA: NEGATIVE
SL AMB POCT BILIRUBIN,UA: NEGATIVE
SL AMB POCT BLOOD,UA: NEGATIVE
SL AMB POCT CLARITY,UA: CLEAR
SL AMB POCT COLOR,UA: YELLOW
SL AMB POCT KETONES,UA: NEGATIVE
SL AMB POCT NITRITE,UA: NEGATIVE
SL AMB POCT PH,UA: 6
SL AMB POCT SPECIFIC GRAVITY,UA: 1
SL AMB POCT URINE PROTEIN: NORMAL
SL AMB POCT UROBILINOGEN: NEGATIVE

## 2022-09-30 PROCEDURE — 93000 ELECTROCARDIOGRAM COMPLETE: CPT | Performed by: FAMILY MEDICINE

## 2022-09-30 PROCEDURE — 81002 URINALYSIS NONAUTO W/O SCOPE: CPT | Performed by: FAMILY MEDICINE

## 2022-09-30 PROCEDURE — 99214 OFFICE O/P EST MOD 30 MIN: CPT | Performed by: FAMILY MEDICINE

## 2022-09-30 NOTE — PROGRESS NOTES
Assessment/Plan:    Chest pain  -H/o of chronic chest pain and shortness of breath associated with h/o of anxiety  -Pt asymptomatic for the past 2 months, but now 16 hours h/o of intermittent chest pain/shortness of breath  - EKG in office unchanged from previous in 7/5/2022  -Benign physical exam, vitals stable  -Continue current medications  -Will refer for Outpatient follow with Cardiologist      Return if symptoms worsen or fail to improve, for Next scheduled follow up  Diagnoses and all orders for this visit:    Chest pain, unspecified type  -     Ambulatory Referral to Cardiology; Future  -     POCT ECG  -     POCT urine dip        Subjective:     HPI  Delroy Joiner is a 80 y o  female  who presented to the office today for a SAME DAY VISIT accompanied by her daughter Roldan Wagner  The daughter provides most of the history as pt has Dementia  They moved about 2 months ago from [place], [place]  She states that her mother was awake last night due to chest pain and some shortness of breath  About 2 months ago she had similar chest pain that resolved, but again occurred last night  The daughter placed Lorazepam 0 5 mg sublingual once at 8 pm and then again at 1 am   The sx seemed to resolve each time, but then returned a few hours later  Daughter also mentions that in the past when mother had UTI she also presented with similar sx  H/o of urinary incontinence and the urine is foul smelling      The following portions of the patient's history were reviewed and updated as appropriate: allergies, current medications, past family history, past medical history, past social history, past surgical history and problem list     Patient Active Problem List   Diagnosis    Chest pain    SIRS (systemic inflammatory response syndrome) ([facility]    Confusion    HTN (hypertension)    Chronic kidney disease    Dementia [facility]    Vaginal prolapse    Fecal smearing         Current Outpatient Medications:     amLODIPine (NORVASC) 10 mg tablet, Take 1 tablet (10 mg total) by mouth daily, Disp: 90 tablet, Rfl: 1    bisoprolol (ZEBETA) 5 mg tablet, Take 0 5 tablets (2 5 mg total) by mouth daily, Disp: 30 tablet, Rfl: 2    clopidogrel (PLAVIX) 75 mg tablet, Take 75 mg by mouth daily, Disp: , Rfl:     Diclofenac Sodium (VOLTAREN) 1 %, Apply 4 g topically 2 (two) times a day, Disp: , Rfl:     diphenhydrAMINE-acetaminophen (TYLENOL PM)  MG TABS, Take 1 tablet by mouth daily at bedtime as needed for sleep, Disp: , Rfl:     hydrocortisone 2 5 % cream, Apply to anus 3-4 times a day as needed for pain , Disp: , Rfl:     losartan (COZAAR) 100 MG tablet, Take 1 tablet (100 mg total) by mouth daily, Disp: 90 tablet, Rfl: 1    memantine (NAMENDA) 10 mg tablet, Take 20 mg by mouth daily, Disp: , Rfl:     psyllium (METAMUCIL) 58 6 % packet, Take 3 4 g by mouth daily, Disp: , Rfl:     QUEtiapine (SEROquel) 50 mg tablet, Take 1 tablet (50 mg total) by mouth daily, Disp: 90 tablet, Rfl: 1    rosuvastatin (CRESTOR) 10 MG tablet, Take 1 tablet (10 mg total) by mouth daily, Disp: 90 tablet, Rfl: 1    Recent Results (from the past 672 hour(s))   POCT urine dip    Collection Time: 09/30/22 11:23 AM   Result Value Ref Range    LEUKOCYTE ESTERASE,UA negative     NITRITE,UA negative     SL AMB POCT UROBILINOGEN negative     POCT URINE PROTEIN trace      PH,UA 6     BLOOD,UA negative     SPECIFIC GRAVITY,UA 1 005     KETONES,UA negative     BILIRUBIN,UA negative     GLUCOSE, UA normal      COLOR,UA yellow     CLARITY,UA clear         Review of Systems   Constitutional: Negative for chills and fever  HENT: Negative for congestion, rhinorrhea and sore throat  Respiratory: Positive for shortness of breath  Negative for cough  Cardiovascular: Positive for chest pain  Gastrointestinal: Negative for diarrhea, nausea and vomiting  Skin: Negative for rash  Neurological: Positive for headaches  Negative for dizziness  Psychiatric/Behavioral: Positive for confusion  Provided by the daughter      Objective:    /74 (BP Location: Left arm, Patient Position: Sitting, Cuff Size: Standard)   Pulse (!) 106   Temp 97 8 °F (36 6 °C) (Temporal)   Resp 18   Ht 4' 11" (1 499 m)   Wt 55 3 kg (122 lb)   SpO2 98%   BMI 24 64 kg/m²     Physical Exam  Vitals and nursing note reviewed  Constitutional:       General: She is not in acute distress  Appearance: Normal appearance  She is normal weight  She is not ill-appearing, toxic-appearing or diaphoretic  Comments: Calmly sitting in chair, no acute distress   HENT:      Head: Normocephalic and atraumatic  Mouth/Throat:      Mouth: Mucous membranes are moist       Pharynx: Oropharynx is clear  Cardiovascular:      Rate and Rhythm: Regular rhythm  Tachycardia present  Heart sounds: Murmur heard  Pulmonary:      Effort: Pulmonary effort is normal       Breath sounds: Normal breath sounds  Abdominal:      General: There is no distension  Palpations: Abdomen is soft  Tenderness: There is no abdominal tenderness  Neurological:      Mental Status: She is alert  Psychiatric:         Cognition and Memory: Cognition is impaired  Memory is impaired        Comments: Pt responds to some questions         Daniel Colbert  09/30/22  11:28 AM

## 2022-09-30 NOTE — ASSESSMENT & PLAN NOTE
-H/o of chronic chest pain and shortness of breath associated with h/o of anxiety  -Pt asymptomatic for the past 2 months, but now 16 hours h/o of intermittent chest pain/shortness of breath  - EKG in office unchanged from previous in 7/5/2022  -Benign physical exam, vitals stable  -Continue current medications  -Will refer for Outpatient follow with Cardiologist

## 2022-10-03 ENCOUNTER — PATIENT OUTREACH (OUTPATIENT)
Dept: FAMILY MEDICINE CLINIC | Facility: CLINIC | Age: 87
End: 2022-10-03

## 2022-10-03 NOTE — PROGRESS NOTES
Incoming / Ar Jacey Calls:  10/3/2022    OC did receive a call from pt's daughter, Adrian Cherry to check on status of pt's [facility] application  [address] [place] [address] did check on [employer] website and it stated that pt was denied MA  [address] [place] [address] did facilitate a three way call to [facility] to inquire about this and we were informed they need copies of pt's medical dx, ID and a document signed which they mailed out to pt on 9/28/22  OC did end call with Adrian Cherry and scanned all requested documents to [facility] as requested with the exception of signed document  Adrian Jo Ann agreed to call [address] [place] [address] once she receives form in the mail  CMOC to f/u  Next outreach is scheduled for 10/10/2022

## 2022-10-10 ENCOUNTER — PATIENT OUTREACH (OUTPATIENT)
Dept: FAMILY MEDICINE CLINIC | Facility: CLINIC | Age: 87
End: 2022-10-10

## 2022-10-10 NOTE — PROGRESS NOTES
Outgoing Call:  10/10/2022    [facility] did call pt to discuss status of letter pt should have received from [facility]  Letter has a document which pt needs to sign and return to [facility] to complete financial review for waiver services  Voice message left  Next outreach is scheduled for 10/17/2022

## 2022-10-12 ENCOUNTER — TELEPHONE (OUTPATIENT)
Dept: GERIATRICS | Age: 87
End: 2022-10-12

## 2022-10-12 NOTE — TELEPHONE ENCOUNTER
[facility]  [address] [facility], [address] [place] [address], [address] [place] [address]    [phone]    Telephone Intake: Geriatric Assessment     Referral source: Libertad Raymundo MD    Caller who is scheduling/relationship to pt:grand daughter-in-law, Elizabeth Dobbins phone number: [phone]    Reason for referral: Patient concerns , Family member concerns and Provider concerns regarding memory concerns and behavior changes/concerns  If there are behavioral concerns, is the pt prescribed medications to manage these? no   If so, how many? none   Has the patient ever had an inpatient psychiatric hospitalization? No,   What is the goal of the visit? initial assessment and diagnosis; resources; and caregiver support  Has the patient been seen by a Neurologist or Geriatrician? Yes in [place] due to a fall with possible brain injury  Brain Injury was ruled out  If yes, is this appointment for a second opinion? No  Has the patient ever been diagnosed with dementia? Yes       Preferred language? 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 Yes   Does the patient use hearing aids? No     Is there a living will/healthcare POA in place/If so, who? No,     Does the pt/caregiver have access for a virtual visit (computer/smart phone with audio/video)? Yes     Caller was informed: Please make sure the pt is accompanied by someone who knows them well / caregiver / family member to participate in this appointment  Who will accompany the pt (name and relationship)? Erica Lauren  Phone number of person accompanying pt: [phone]    Office packet mailed out to: [address] [place] [address]  Artemio Vargas   [phone]    Added to wait list for sooner appointments?  Yes     NOTE FOR : Please route to provider for chart review prior to scheduling and let the caller know that this phone intake will be reviewed IF -  • Pt was recently hospitalized  • Pt is prescribed medications for behavior management or has a history of psychiatric hospitalization  • Pt plans to attend alone

## 2022-10-13 ENCOUNTER — APPOINTMENT (OUTPATIENT)
Dept: RADIOLOGY | Facility: HOSPITAL | Age: 87
End: 2022-10-13
Payer: MEDICAID

## 2022-10-13 ENCOUNTER — HOSPITAL ENCOUNTER (OUTPATIENT)
Facility: HOSPITAL | Age: 87
Setting detail: OBSERVATION
Discharge: HOME | End: 2022-10-14
Attending: STUDENT IN AN ORGANIZED HEALTH CARE EDUCATION/TRAINING PROGRAM | Admitting: FAMILY MEDICINE
Payer: MEDICAID

## 2022-10-13 DIAGNOSIS — R07.9 CHEST PAIN, UNSPECIFIED TYPE: Primary | ICD-10-CM

## 2022-10-13 DIAGNOSIS — F03.90 DEMENTIA (HCC): ICD-10-CM

## 2022-10-13 DIAGNOSIS — K60.2 RECTAL FISSURE: ICD-10-CM

## 2022-10-13 DIAGNOSIS — K62.5 RECTAL BLEEDING: ICD-10-CM

## 2022-10-13 PROBLEM — R26.2 AMBULATORY DYSFUNCTION: Status: ACTIVE | Noted: 2022-10-13

## 2022-10-13 PROBLEM — D64.9 ANEMIA: Status: ACTIVE | Noted: 2022-10-13

## 2022-10-13 PROBLEM — H54.40 BLINDNESS OF LEFT EYE: Status: ACTIVE | Noted: 2022-10-13

## 2022-10-13 PROBLEM — Z91.89 AT RISK FOR DELIRIUM: Status: ACTIVE | Noted: 2022-10-13

## 2022-10-13 LAB
2HR DELTA HS TROPONIN: 1 NG/L
4HR DELTA HS TROPONIN: 1 NG/L
ALBUMIN SERPL BCP-MCNC: 4.2 G/DL (ref 3.5–5)
ALP SERPL-CCNC: 123 U/L (ref 43–122)
ALT SERPL W P-5'-P-CCNC: 16 U/L
ANION GAP SERPL CALCULATED.3IONS-SCNC: 11 MMOL/L (ref 5–14)
AST SERPL W P-5'-P-CCNC: 20 U/L (ref 14–36)
ATRIAL RATE: 112 BPM
ATRIAL RATE: 113 BPM
ATRIAL RATE: 113 BPM
BASOPHILS # BLD AUTO: 0.06 THOUSANDS/ΜL (ref 0–0.1)
BASOPHILS NFR BLD AUTO: 1 % (ref 0–1)
BILIRUB DIRECT SERPL-MCNC: 0 MG/DL (ref 0–0.2)
BILIRUB SERPL-MCNC: 0.27 MG/DL (ref 0.2–1)
BILIRUB UR QL STRIP: NEGATIVE
BUN SERPL-MCNC: 32 MG/DL (ref 5–25)
CALCIUM SERPL-MCNC: 9.4 MG/DL (ref 8.4–10.2)
CARDIAC TROPONIN I PNL SERPL HS: 10 NG/L
CARDIAC TROPONIN I PNL SERPL HS: 10 NG/L
CARDIAC TROPONIN I PNL SERPL HS: 9 NG/L
CHLORIDE SERPL-SCNC: 108 MMOL/L (ref 96–108)
CLARITY UR: CLEAR
CO2 SERPL-SCNC: 19 MMOL/L (ref 21–32)
COLOR UR: NORMAL
CREAT SERPL-MCNC: 1.5 MG/DL (ref 0.6–1.2)
EOSINOPHIL # BLD AUTO: 0.16 THOUSAND/ΜL (ref 0–0.61)
EOSINOPHIL NFR BLD AUTO: 2 % (ref 0–6)
ERYTHROCYTE [DISTWIDTH] IN BLOOD BY AUTOMATED COUNT: 13.7 % (ref 11.6–15.1)
FERRITIN SERPL-MCNC: 35 NG/ML (ref 8–388)
GFR SERPL CREATININE-BSD FRML MDRD: 31 ML/MIN/1.73SQ M
GLUCOSE SERPL-MCNC: 97 MG/DL (ref 70–99)
GLUCOSE UR STRIP-MCNC: NEGATIVE MG/DL
HCT VFR BLD AUTO: 28.5 % (ref 34.8–46.1)
HCT VFR BLD AUTO: 29.1 % (ref 34.8–46.1)
HGB BLD-MCNC: 9.4 G/DL (ref 11.5–15.4)
HGB BLD-MCNC: 9.6 G/DL (ref 11.5–15.4)
HGB UR QL STRIP.AUTO: NEGATIVE
IMM GRANULOCYTES # BLD AUTO: 0.02 THOUSAND/UL (ref 0–0.2)
IMM GRANULOCYTES NFR BLD AUTO: 0 % (ref 0–2)
IRON SATN MFR SERPL: 16 % (ref 15–50)
IRON SERPL-MCNC: 54 UG/DL (ref 50–170)
KETONES UR STRIP-MCNC: NEGATIVE MG/DL
LEUKOCYTE ESTERASE UR QL STRIP: NEGATIVE
LYMPHOCYTES # BLD AUTO: 3.17 THOUSANDS/ΜL (ref 0.6–4.47)
LYMPHOCYTES NFR BLD AUTO: 43 % (ref 14–44)
MAGNESIUM SERPL-MCNC: 1.8 MG/DL (ref 1.6–2.3)
MCH RBC QN AUTO: 29.5 PG (ref 26.8–34.3)
MCHC RBC AUTO-ENTMCNC: 33 G/DL (ref 31.4–37.4)
MCV RBC AUTO: 90 FL (ref 82–98)
MONOCYTES # BLD AUTO: 0.8 THOUSAND/ΜL (ref 0.17–1.22)
MONOCYTES NFR BLD AUTO: 11 % (ref 4–12)
NEUTROPHILS # BLD AUTO: 3.19 THOUSANDS/ΜL (ref 1.85–7.62)
NEUTS SEG NFR BLD AUTO: 43 % (ref 43–75)
NITRITE UR QL STRIP: NEGATIVE
NRBC BLD AUTO-RTO: 0 /100 WBCS
P AXIS: 60 DEGREES
P AXIS: 67 DEGREES
P AXIS: 71 DEGREES
PH UR STRIP.AUTO: 6 [PH]
PHOSPHATE SERPL-MCNC: 3.8 MG/DL (ref 2.5–4.8)
PLATELET # BLD AUTO: 314 THOUSANDS/UL (ref 149–390)
PMV BLD AUTO: 10.3 FL (ref 8.9–12.7)
POTASSIUM SERPL-SCNC: 4.4 MMOL/L (ref 3.5–5.3)
PR INTERVAL: 146 MS
PR INTERVAL: 148 MS
PR INTERVAL: 148 MS
PROT SERPL-MCNC: 7.6 G/DL (ref 6.4–8.4)
PROT UR STRIP-MCNC: NEGATIVE MG/DL
QRS AXIS: -11 DEGREES
QRS AXIS: -18 DEGREES
QRS AXIS: -9 DEGREES
QRSD INTERVAL: 64 MS
QRSD INTERVAL: 66 MS
QRSD INTERVAL: 66 MS
QT INTERVAL: 316 MS
QT INTERVAL: 330 MS
QT INTERVAL: 336 MS
QTC INTERVAL: 433 MS
QTC INTERVAL: 452 MS
QTC INTERVAL: 458 MS
RBC # BLD AUTO: 3.25 MILLION/UL (ref 3.81–5.12)
SODIUM SERPL-SCNC: 138 MMOL/L (ref 135–147)
SP GR UR STRIP.AUTO: 1.01 (ref 1–1.04)
T WAVE AXIS: 39 DEGREES
T WAVE AXIS: 40 DEGREES
T WAVE AXIS: 52 DEGREES
TIBC SERPL-MCNC: 332 UG/DL (ref 250–450)
TSH SERPL DL<=0.05 MIU/L-ACNC: 2.42 UIU/ML (ref 0.45–4.5)
UROBILINOGEN UA: NEGATIVE MG/DL
VENTRICULAR RATE: 112 BPM
VENTRICULAR RATE: 113 BPM
VENTRICULAR RATE: 113 BPM
WBC # BLD AUTO: 7.4 THOUSAND/UL (ref 4.31–10.16)

## 2022-10-13 PROCEDURE — 85014 HEMATOCRIT: CPT

## 2022-10-13 PROCEDURE — 93010 ELECTROCARDIOGRAM REPORT: CPT | Performed by: STUDENT IN AN ORGANIZED HEALTH CARE EDUCATION/TRAINING PROGRAM

## 2022-10-13 PROCEDURE — 81003 URINALYSIS AUTO W/O SCOPE: CPT

## 2022-10-13 PROCEDURE — 85018 HEMOGLOBIN: CPT

## 2022-10-13 PROCEDURE — 99285 EMERGENCY DEPT VISIT HI MDM: CPT

## 2022-10-13 PROCEDURE — 36415 COLL VENOUS BLD VENIPUNCTURE: CPT

## 2022-10-13 PROCEDURE — 83735 ASSAY OF MAGNESIUM: CPT

## 2022-10-13 PROCEDURE — 93005 ELECTROCARDIOGRAM TRACING: CPT

## 2022-10-13 PROCEDURE — 84100 ASSAY OF PHOSPHORUS: CPT

## 2022-10-13 PROCEDURE — 71045 X-RAY EXAM CHEST 1 VIEW: CPT

## 2022-10-13 PROCEDURE — 80048 BASIC METABOLIC PNL TOTAL CA: CPT

## 2022-10-13 PROCEDURE — 99244 OFF/OP CNSLTJ NEW/EST MOD 40: CPT | Performed by: FAMILY MEDICINE

## 2022-10-13 PROCEDURE — 84443 ASSAY THYROID STIM HORMONE: CPT | Performed by: STUDENT IN AN ORGANIZED HEALTH CARE EDUCATION/TRAINING PROGRAM

## 2022-10-13 PROCEDURE — 84484 ASSAY OF TROPONIN QUANT: CPT

## 2022-10-13 PROCEDURE — 83550 IRON BINDING TEST: CPT | Performed by: STUDENT IN AN ORGANIZED HEALTH CARE EDUCATION/TRAINING PROGRAM

## 2022-10-13 PROCEDURE — 82728 ASSAY OF FERRITIN: CPT | Performed by: STUDENT IN AN ORGANIZED HEALTH CARE EDUCATION/TRAINING PROGRAM

## 2022-10-13 PROCEDURE — 80076 HEPATIC FUNCTION PANEL: CPT

## 2022-10-13 PROCEDURE — 99285 EMERGENCY DEPT VISIT HI MDM: CPT | Performed by: STUDENT IN AN ORGANIZED HEALTH CARE EDUCATION/TRAINING PROGRAM

## 2022-10-13 PROCEDURE — 83540 ASSAY OF IRON: CPT | Performed by: STUDENT IN AN ORGANIZED HEALTH CARE EDUCATION/TRAINING PROGRAM

## 2022-10-13 PROCEDURE — 85025 COMPLETE CBC W/AUTO DIFF WBC: CPT

## 2022-10-13 RX ORDER — LOSARTAN POTASSIUM 50 MG/1
100 TABLET ORAL DAILY
Status: DISCONTINUED | OUTPATIENT
Start: 2022-10-13 | End: 2022-10-14 | Stop reason: HOSPADM

## 2022-10-13 RX ORDER — BISOPROLOL FUMARATE 5 MG/1
2.5 TABLET, FILM COATED ORAL DAILY
Status: DISCONTINUED | OUTPATIENT
Start: 2022-10-13 | End: 2022-10-14 | Stop reason: HOSPADM

## 2022-10-13 RX ORDER — MEMANTINE HYDROCHLORIDE 5 MG/1
20 TABLET ORAL DAILY
Status: DISCONTINUED | OUTPATIENT
Start: 2022-10-13 | End: 2022-10-13

## 2022-10-13 RX ORDER — HYDROCORTISONE 25 MG/G
CREAM TOPICAL 2 TIMES DAILY
Status: DISCONTINUED | OUTPATIENT
Start: 2022-10-13 | End: 2022-10-14 | Stop reason: HOSPADM

## 2022-10-13 RX ORDER — MEMANTINE HYDROCHLORIDE 5 MG/1
10 TABLET ORAL DAILY
Status: DISCONTINUED | OUTPATIENT
Start: 2022-10-14 | End: 2022-10-14 | Stop reason: HOSPADM

## 2022-10-13 RX ORDER — PRAVASTATIN SODIUM 40 MG
80 TABLET ORAL
Status: DISCONTINUED | OUTPATIENT
Start: 2022-10-13 | End: 2022-10-14 | Stop reason: HOSPADM

## 2022-10-13 RX ORDER — QUETIAPINE FUMARATE 50 MG/1
50 TABLET, FILM COATED ORAL DAILY
Status: DISCONTINUED | OUTPATIENT
Start: 2022-10-13 | End: 2022-10-13

## 2022-10-13 RX ORDER — ACETAMINOPHEN 325 MG/1
650 TABLET ORAL EVERY 6 HOURS PRN
Status: DISCONTINUED | OUTPATIENT
Start: 2022-10-13 | End: 2022-10-14 | Stop reason: HOSPADM

## 2022-10-13 RX ORDER — LANOLIN ALCOHOL/MO/W.PET/CERES
3 CREAM (GRAM) TOPICAL
Status: DISCONTINUED | OUTPATIENT
Start: 2022-10-13 | End: 2022-10-14 | Stop reason: HOSPADM

## 2022-10-13 RX ORDER — QUETIAPINE FUMARATE 25 MG/1
25 TABLET, FILM COATED ORAL DAILY
Status: DISCONTINUED | OUTPATIENT
Start: 2022-10-14 | End: 2022-10-14

## 2022-10-13 RX ORDER — AMLODIPINE BESYLATE 10 MG/1
10 TABLET ORAL DAILY
Status: DISCONTINUED | OUTPATIENT
Start: 2022-10-13 | End: 2022-10-14 | Stop reason: HOSPADM

## 2022-10-13 RX ADMIN — MEMANTINE 20 MG: 5 TABLET ORAL at 14:54

## 2022-10-13 RX ADMIN — AMLODIPINE BESYLATE 10 MG: 10 TABLET ORAL at 14:54

## 2022-10-13 RX ADMIN — HYDROCORTISONE: 25 CREAM TOPICAL at 17:32

## 2022-10-13 RX ADMIN — LOSARTAN POTASSIUM 100 MG: 50 TABLET, FILM COATED ORAL at 14:54

## 2022-10-13 RX ADMIN — ACETAMINOPHEN 650 MG: 325 TABLET, FILM COATED ORAL at 14:53

## 2022-10-13 RX ADMIN — QUETIAPINE FUMARATE 50 MG: 50 TABLET ORAL at 14:54

## 2022-10-13 RX ADMIN — PRAVASTATIN SODIUM 80 MG: 40 TABLET ORAL at 17:32

## 2022-10-13 RX ADMIN — ACETAMINOPHEN 650 MG: 325 TABLET, FILM COATED ORAL at 20:02

## 2022-10-13 RX ADMIN — BISOPROLOL FUMARATE 2.5 MG: 5 TABLET ORAL at 14:54

## 2022-10-13 RX ADMIN — MELATONIN TAB 3 MG 3 MG: 3 TAB at 21:03

## 2022-10-13 NOTE — PLAN OF CARE
Problem: Potential for Falls  Goal: Patient will remain free of falls  Description: INTERVENTIONS:  - Educate patient/family on patient safety including physical limitations  - Instruct patient to call for assistance with activity   - Consult OT/PT to assist with strengthening/mobility   - Keep Call bell within reach  - Keep bed low and locked with side rails adjusted as appropriate  - Keep care items and personal belongings within reach  - Initiate and maintain comfort rounds  - Make Fall Risk Sign visible to staff  - Offer Toileting every 4Hours, in advance of need  - Initiate/Maintain alarm    - Apply yellow socks and bracelet for high fall risk patients  - Consider moving patient to room near nurses station  Outcome: Progressing     Problem: Prexisting or High Potential for Compromised Skin Integrity  Goal: Skin integrity is maintained or improved  Description: INTERVENTIONS:  - Identify patients at risk for skin breakdown  - Assess and monitor skin integrity  - Assess and monitor nutrition and hydration status  - Monitor labs   - Assess for incontinence   - Turn and reposition patient  - Assist with mobility/ambulation  - Relieve pressure over bony prominences  - Avoid friction and shearing  - Provide appropriate hygiene as needed including keeping skin clean and dry  - Evaluate need for skin moisturizer/barrier cream  - Collaborate with interdisciplinary team   - Patient/family teaching    Outcome: Progressing     Problem: PAIN - ADULT  Goal: Verbalizes/displays adequate comfort level or baseline comfort level  Description: Interventions:  - Encourage patient to monitor pain and request assistance  - Assess pain using appropriate pain scale  - Administer analgesics based on type and severity of pain and evaluate response  - Implement non-pharmacological measures as appropriate and evaluate response  - Consider cultural and social influences on pain and pain management  - Notify physician/advanced practitioner if interventions unsuccessful or patient reports new pain  Outcome: Progressing     Problem: INFECTION - ADULT  Goal: Absence or prevention of progression during hospitalization  Description: INTERVENTIONS:  - Assess and monitor for signs and symptoms of infection  - Monitor lab/diagnostic results  - Monitor all insertion sites, i e  indwelling lines, tubes, and drains    - Administer medications as ordered  - Instruct and encourage patient and family to use good hand hygiene technique  - Identify and instruct in appropriate isolation precautions for identified infection/condition  Outcome: Progressing  Goal: Absence of fever/infection during neutropenic period  Description: INTERVENTIONS:  - Monitor WBC    Outcome: Progressing     Problem: SAFETY ADULT  Goal: Patient will remain free of falls  Description: INTERVENTIONS:  - Educate patient/family on patient safety including physical limitations  - Instruct patient to call for assistance with activity   - Consult OT/PT to assist with strengthening/mobility   - Keep Call bell within reach  - Keep bed low and locked with side rails adjusted as appropriate  - Keep care items and personal belongings within reach  - Initiate and maintain comfort rounds  - Make Fall Risk Sign visible to staff  - Offer Toileting every 4Hours, in advance of need  - Initiate/Maintain alarm    - Apply yellow socks and bracelet for high fall risk patients  - Consider moving patient to room near nurses station  Outcome: Progressing  Goal: Maintain or return to baseline ADL function  Description: INTERVENTIONS:  -  Assess patient's ability to carry out ADLs; assess patient's baseline for ADL function and identify physical deficits which impact ability to perform ADLs (bathing, care of mouth/teeth, toileting, grooming, dressing, etc )  - Assess/evaluate cause of self-care deficits   - Assess range of motion  - Assess patient's mobility; develop plan if impaired  - Assess patient's need for assistive devices and provide as appropriate  - Encourage maximum independence but intervene and supervise when necessary  - Involve family in performance of ADLs  - Assess for home care needs following discharge   - Consider OT consult to assist with ADL evaluation and planning for discharge  - Provide patient education as appropriate  Outcome: Progressing  Goal: Maintains/Returns to pre admission functional level  Description: INTERVENTIONS:  - Perform BMAT or MOVE assessment daily    - Set and communicate daily mobility goal to care team and patient/family/caregiver  - Collaborate with rehabilitation services on mobility goals if consulted    - Out of bed for toileting  - Record patient progress and toleration of activity level   Outcome: Progressing     Problem: DISCHARGE PLANNING  Goal: Discharge to home or other facility with appropriate resources  Description: INTERVENTIONS:  - Identify barriers to discharge w/patient and caregiver  - Arrange for needed discharge resources and transportation as appropriate  - Identify discharge learning needs (meds, wound care, etc )  - Arrange for interpretive services to assist at discharge as needed  - Refer to Case Management Department for coordinating discharge planning if the patient needs post-hospital services based on physician/advanced practitioner order or complex needs related to functional status, cognitive ability, or social support system  Outcome: Progressing     Problem: Knowledge Deficit  Goal: Patient/family/caregiver demonstrates understanding of disease process, treatment plan, medications, and discharge instructions  Description: Complete learning assessment and assess knowledge base    Interventions:  - Provide teaching at level of understanding  - Provide teaching via preferred learning methods  Outcome: Progressing

## 2022-10-13 NOTE — ASSESSMENT & PLAN NOTE
Per ED nurse's report, patient had bright red blood per rectum after bowel movement in the ED  ED nurse noted a rectal prolapse that was reducible; bleeding stopped after prolapse was reduced  Anal fissure appreciated on exam; trace stool on DOMINIC  No blood appreciated on DOMINIC  No active bleeding  Negative hemoccult test  10/13- Hgb:9 6  Hx of DVT; was on Plavix 75 mg daily but stopped taking a month ago  Per daughter, last colonoscopy was 4 years ago and was not aware of any abnormalities      - Start topical Hydrocortisone 2 6% and apply bid to affected area  - Maintain adequate fluid and fiber intake  - Consider enemas or suppositories if constipated  - Hold plavix  - Hgb and Hct tonight     - AM CBC

## 2022-10-13 NOTE — ASSESSMENT & PLAN NOTE
ED BP: 163/82; at goal  Home medications: Bisoprolol 5 mg tablet QD; amlodipine 10 mg tablet QD;  Losartan 100 mg qd    - Continue home meds  - Monitor BP

## 2022-10-13 NOTE — H&P
History and Physical - Jayla 6    Patient Information: Chani Rock 80 y o  female MRN: [id number]  Unit/Bed#: 7T [facility] 704-01 Encounter: [id number]  Admitting Physician: Hamzah Hanley  PCP: Art Mendez MD  Date of Admission:  10/13/22    Assessment and Plan    * Chest pain  Assessment & Plan  10/13/22: ED presentation for chest pain   Hx of DM, CKD, HTN, HLD, Cerebrovascular disease  10/13/2022- EKG- Normal  Trops  X 1 neg   Last Stress echo on 7/5/2022, significant for mild to moderate mitral valve regurgitation and concentric left ventricular hypertrophy, and mild pulmonary hypertension  Chest pain of similar nature noted during pcp visit on 9/30/2022 related to anxiety  Home medications: Bisoprolol 5 mg tablet QD; amlodipine 10 mg tablet QD; Losartan 100 mg qd, Pravastatin 80mg qd    - Continue home HTN meds  - Continue statin  - Telemetry monitoring  - AM BMP    Bright red blood per rectum  Assessment & Plan  Per ED nurse's report, patient had bright red blood per rectum after bowel movement in the ED  ED nurse noted a rectal prolapse that was reducible; bleeding stopped after prolapse was reduced  Anal fissure appreciated on exam; trace stool on DOMINIC  No blood appreciated on DOMINIC  No active bleeding  Negative hemoccult test  10/13- Hgb:9 6  Hx of DVT; was on Plavix 75 mg daily but stopped taking a month ago  Per daughter, last colonoscopy was 4 years ago and was not aware of any abnormalities      - Start topical Hydrocortisone 2 6% and apply bid to affected area  - Maintain adequate fluid and fiber intake  - Consider enemas or suppositories if constipated  - Hold plavix  - Hgb and Hct tonight     - AM CBC      Anemia  Assessment & Plan  Hgb on admission 9 6  Baseline 9 2  BRBPR on admission and with BM  See A& P rectal bleeding    - AM CBC  - Closely monitor BM/watch for rectal bleed    Dementia (HCC)  Assessment & Plan  Hx of dementia  Home meds: Memantine 10 mg tablet daily        - Continue home meds  - Maintain normal sleep/wake cycle  - Maintain normal urine and bowel functions with urinary retention protocol and bowel regimen  - Avoid all BEER's criteria drugs as much as possible  - Maintain adequate hydration   - Maintain daily orientation to environment and encourage family at bedside   - Avoid physical and, if possible, chemical restraints  Chronic kidney disease  Assessment & Plan  Lab Results   Component Value Date    EGFR 31 10/13/2022    EGFR 30 07/05/2022    EGFR 22 07/04/2022    CREATININE 1 50 (H) 10/13/2022    CREATININE 1 54 (H) 07/05/2022    CREATININE 1 98 (H) 07/04/2022     On nephroprotective BP med- Losartan daily    - Monitor I/O  - Avoid nephrotoxic medications    HTN (hypertension)  Assessment & Plan  ED BP: 163/82; at goal  Home medications: Bisoprolol 5 mg tablet QD; amlodipine 10 mg tablet QD; Losartan 100 mg qd    - Continue home meds  - Monitor BP            VTE Prophylaxis: Yes  Code Status: Level 3 - DNAR and DNI  Anticipated Length of Stay:  Patient will be admitted on an Observation basis with an anticipated length of stay of 2 midnights  Justification for Hospital Stay: Chest pain- rule out ACS  Total Time for Visit, including Counseling / Coordination of Care: 60 mins  Greater than 50% of this total time spent on direct patient counseling and coordination of care    Patient Information Sharing: Unable to obtain consent due to dementia however, daughter, who is the POA, was at bedside at the time of admission and all her questions were answered using [employer]  #[id number]     Chief Complaint:     Chief Complaint   Patient presents with   • Chest Pain     Left sided chest pain that started this morning at 0500     History of Present Illness:    Solis Lopez is a 80 y o  female with a past medical history of hypertension, Alzheimer's dementia, CKD, and hyperlipidemia presents to the ED today for chest pain that began in the morning  Her daughter provided the history today and stated that the pain is a/w anxiety, and patient often gets these symptoms with hyperventilation  Chest pain worsens when palpated but does not radiate  Her last episode was 9/30/2022  Her daughter denies chills, fever, diaphoresis, arm or jaw pain, back pain, epigastric pain, or SOB  She was admitted to the [facility] Medicine service to rule out ACS  ED COURSE  VS: 163/82; RR-27; HR-110  Labs: UA negative; BUN 32, Cr: 1 5, Trops neg; Alk phos-123  Imaging: Negative chest x-ray  EKG: Sinus tachycardia  No acute ST-T wave changes  Meds: N/A    Review of Systems:  Review of Systems   Unable to perform ROS: Dementia       Past Medical and Surgical History:   Past Medical History:   Diagnosis Date   • Dementia ([address] )    • HLD (hyperlipidemia)    • HTN (hypertension)    • Vaginal prolapse      History reviewed  No pertinent surgical history  Meds/Allergies: Allergies: No Known Allergies  Prior to Admission Medications   Prescriptions Last Dose Informant Patient Reported? Taking? Diclofenac Sodium (VOLTAREN) 1 %   Yes No   Sig: Apply 4 g topically 2 (two) times a day   QUEtiapine (SEROquel) 50 mg tablet 10/12/2022 at Unknown time  No Yes   Sig: Take 1 tablet (50 mg total) by mouth daily   amLODIPine (NORVASC) 10 mg tablet 10/12/2022 at Unknown time  No Yes   Sig: Take 1 tablet (10 mg total) by mouth daily   bisoprolol (ZEBETA) 5 mg tablet Past Week at Unknown time  No Yes   Sig: Take 0 5 tablets (2 5 mg total) by mouth daily   clopidogrel (PLAVIX) 75 mg tablet Past Week at Unknown time  Yes Yes   Sig: Take 75 mg by mouth daily   diphenhydrAMINE-acetaminophen (TYLENOL PM)  MG TABS Not Taking at Unknown time  Yes No   Sig: Take 1 tablet by mouth daily at bedtime as needed for sleep   Patient not taking: Reported on 10/13/2022   hydrocortisone 2 5 % cream   Yes No   Sig: Apply to anus 3-4 times a day as needed for pain     losartan (COZAAR) 100 MG tablet 10/12/2022 at Unknown time  No Yes   Sig: Take 1 tablet (100 mg total) by mouth daily   memantine (NAMENDA) 10 mg tablet 10/12/2022 at Unknown time  Yes Yes   Sig: Take 20 mg by mouth daily   psyllium (METAMUCIL) 58 6 % packet Not Taking at Unknown time  Yes No   Sig: Take 3 4 g by mouth daily   Patient not taking: Reported on 10/13/2022   rosuvastatin (CRESTOR) 10 MG tablet Past Week at Unknown time  No Yes   Sig: Take 1 tablet (10 mg total) by mouth daily      Facility-Administered Medications: None     Social History:     Social History     Socioeconomic History   • Marital status:      Spouse name: Not on file   • Number of children: Not on file   • Years of education: Not on file   • Highest education level: Not on file   Occupational History   • Not on file   Tobacco Use   • Smoking status: Never Smoker   • Smokeless tobacco: Never Used   Substance and Sexual Activity   • Alcohol use: Never   • Drug use: Never   • Sexual activity: Not on file   Other Topics Concern   • Not on file   Social History Narrative   • Not on file     Social Determinants of Health     Financial Resource Strain: High Risk   • Difficulty of Paying Living Expenses: Hard   Food Insecurity: Food Insecurity Present   • Worried About Running Out of Food in the Last Year: Often true   • Ran Out of Food in the Last Year: Often true   Transportation Needs: No Transportation Needs   • Lack of Transportation (Medical): No   • Lack of Transportation (Non-Medical): No   Physical Activity: Not on file   Stress: Not on file   Social Connections: Not on file   Intimate Partner Violence: Not on file   Housing Stability: Not on file     Patient Pre-hospital Living Situation: Unknown  Patient Pre-hospital Level of Mobility: Uses a walker  Patient Pre-hospital Diet Restrictions: Unknown    Family History:  History reviewed  No pertinent family history      Physical Exam:   Vitals:   Blood Pressure: 116/71 (10/13/22 1951)  Pulse: 83 (10/13/22 1951)  Temperature: 98 °F (36 7 °C) (10/13/22 1951)  Temp Source: Temporal (10/13/22 1951)  Respirations: 22 (10/13/22 1951)  Height: 4' 11" (149 9 cm) (10/13/22 1406)  Weight - Scale: 55 kg (121 lb 4 1 oz) (10/13/22 1406)  SpO2: 97 % (10/13/22 1951)    Physical Exam  Constitutional:       General: She is not in acute distress  Appearance: She is normal weight  She is not ill-appearing, toxic-appearing or diaphoretic  HENT:      Head:      Comments: Facial asymmetry reported chronic by daughter  Cardiovascular:      Heart sounds: Murmur heard  No friction rub  No gallop  Pulmonary:      Effort: Pulmonary effort is normal       Breath sounds: Normal breath sounds  Comments: Very mild, fine crackles in left lung base  Abdominal:      General: Bowel sounds are normal       Palpations: Abdomen is soft  Tenderness: There is no abdominal tenderness  There is no right CVA tenderness, left CVA tenderness, guarding or rebound  Genitourinary:     Vagina: No vaginal discharge  Rectum: Guaiac result negative  Comments: Rectal fissure  Musculoskeletal:      Right lower leg: No edema  Left lower leg: No edema  Skin:     General: Skin is warm  Capillary Refill: Capillary refill takes less than 2 seconds  Neurological:      General: No focal deficit present  Mental Status: She is alert  Mental status is at baseline  Lab Results: I have personally reviewed pertinent reports      Results from last 7 days   Lab Units 10/13/22  0853   WBC Thousand/uL 7 40   HEMOGLOBIN g/dL 9 6*   HEMATOCRIT % 29 1*   PLATELETS Thousands/uL 314   NEUTROS PCT % 43   LYMPHS PCT % 43   MONOS PCT % 11   EOS PCT % 2     Results from last 7 days   Lab Units 10/13/22  0853   POTASSIUM mmol/L 4 4   CHLORIDE mmol/L 108   CO2 mmol/L 19*   BUN mg/dL 32*   CREATININE mg/dL 1 50*   CALCIUM mg/dL 9 4   ALK PHOS U/L 123*   ALT U/L 16   AST U/L 20   EGFR ml/min/1 73sq m 31   MAGNESIUM mg/dL 1 8   PHOSPHORUS mg/dL 3 8                          Results from last 7 days   Lab Units 10/13/22  0951   COLOR UA  Straw   CLARITY UA  Clear   SPEC GRAV UA  1 010   PH UA  6 0   LEUKOCYTES UA  Negative   NITRITE UA  Negative   GLUCOSE UA mg/dl Negative   KETONES UA mg/dl Negative   BILIRUBIN UA  Negative   BLOOD UA  Negative             Imaging: I have personally reviewed pertinent reports  X-ray chest 1 view portable    Result Date: 10/13/2022  Narrative: CHEST INDICATION:   chest pain  COMPARISON:  CXR and CT 7/4/2022  EXAM PERFORMED/VIEWS:  XR CHEST PORTABLE FINDINGS: Cardiomediastinal silhouette appears unremarkable  The lungs are clear  No pneumothorax or pleural effusion  Osseous structures appear within normal limits for patient age  Impression: No acute cardiopulmonary disease  Workstation performed: [id number]       EKG, Pathology, and Other Studies Reviewed on Admission:   EKG  Result Date: 10/13/22  Impression:  Sinus tachycardia   Otherwise normal EKG    Entire H&P was discussed with Dr Robbie Pope who agreed to what is noted above    [facility] Yulisa  10/13/22  8:12 PM

## 2022-10-13 NOTE — ASSESSMENT & PLAN NOTE
10/13/22: ED presentation for chest pain   Hx of DM, CKD, HTN, HLD, Cerebrovascular disease  10/13/2022- EKG- Normal  Trops  X 1 neg   Last Stress echo on 7/5/2022, significant for mild to moderate mitral valve regurgitation and concentric left ventricular hypertrophy, and mild pulmonary hypertension  Chest pain of similar nature noted during pcp visit on 9/30/2022 related to anxiety  Home medications: Bisoprolol 5 mg tablet QD; amlodipine 10 mg tablet QD;  Losartan 100 mg qd, Pravastatin 80mg qd  No c/o chest pain today 10/14/2022    - Continue home HTN meds  - Continue statin  - Plan for discharge

## 2022-10-13 NOTE — ED PROVIDER NOTES
History  Chief Complaint   Patient presents with   • Chest Pain     Left sided chest pain that started this morning at 0500     This is a 80 y o with PMH of hypertension, Alzheimer's dementia, CKD, and hyperlipidemia who presents today for chest pain associated with increased respiratory effort that started this morning after waking up  Patient is accompanied by her daughter who reports previous episodes of similar chest pain, last one was on 9/30/22, at the time work up was negative for ACS  Patient reports feeling better and denies any pain at the moment  Daughter denies any pulmonary diseases and hx of smoking  Patient denies SOB, nausea, vomiting, fever, cough, dysuria, or hematuria  However daughter reports that mother was complaining of nausea earlier  Prior to Admission Medications   Prescriptions Last Dose Informant Patient Reported? Taking? Diclofenac Sodium (VOLTAREN) 1 %   Yes No   Sig: Apply 4 g topically 2 (two) times a day   QUEtiapine (SEROquel) 50 mg tablet 10/12/2022 at Unknown time  No Yes   Sig: Take 1 tablet (50 mg total) by mouth daily   amLODIPine (NORVASC) 10 mg tablet 10/12/2022 at Unknown time  No Yes   Sig: Take 1 tablet (10 mg total) by mouth daily   bisoprolol (ZEBETA) 5 mg tablet Past Week at Unknown time  No Yes   Sig: Take 0 5 tablets (2 5 mg total) by mouth daily   clopidogrel (PLAVIX) 75 mg tablet Past Week at Unknown time  Yes Yes   Sig: Take 75 mg by mouth daily   diphenhydrAMINE-acetaminophen (TYLENOL PM)  MG TABS Not Taking at Unknown time  Yes No   Sig: Take 1 tablet by mouth daily at bedtime as needed for sleep   Patient not taking: Reported on 10/13/2022   hydrocortisone 2 5 % cream   Yes No   Sig: Apply to anus 3-4 times a day as needed for pain     losartan (COZAAR) 100 MG tablet 10/12/2022 at Unknown time  No Yes   Sig: Take 1 tablet (100 mg total) by mouth daily   memantine (NAMENDA) 10 mg tablet 10/12/2022 at Unknown time  Yes Yes   Sig: Take 20 mg by mouth daily   psyllium (METAMUCIL) 58 6 % packet Not Taking at Unknown time  Yes No   Sig: Take 3 4 g by mouth daily   Patient not taking: Reported on 10/13/2022   rosuvastatin (CRESTOR) 10 MG tablet Past Week at Unknown time  No Yes   Sig: Take 1 tablet (10 mg total) by mouth daily      Facility-Administered Medications: None       Past Medical History:   Diagnosis Date   • Dementia ([address] )    • HLD (hyperlipidemia)    • HTN (hypertension)    • Vaginal prolapse        History reviewed  No pertinent surgical history  History reviewed  No pertinent family history  I have reviewed and agree with the history as documented  E-Cigarette/Vaping     E-Cigarette/Vaping Substances     Social History     Tobacco Use   • Smoking status: Never Smoker   • Smokeless tobacco: Never Used   Substance Use Topics   • Alcohol use: Never   • Drug use: Never        Review of Systems   Constitutional: Negative for chills and fever  Respiratory: Negative for cough, shortness of breath and wheezing  Cardiovascular: Negative for chest pain and palpitations  Gastrointestinal: Positive for nausea  Negative for abdominal pain, diarrhea and vomiting  Genitourinary: Negative for dysuria and hematuria  Musculoskeletal: Negative for arthralgias and back pain  Right shoulder pain    Skin: Negative for color change and rash  Neurological: Negative for seizures and syncope  All other systems reviewed and are negative        Physical Exam  ED Triage Vitals   Temperature Pulse Respirations Blood Pressure SpO2   10/13/22 1137 10/13/22 0805 10/13/22 0805 10/13/22 0805 10/13/22 0805   98 1 °F (36 7 °C) (!) 110 (!) 27 163/82 100 %      Temp Source Heart Rate Source Patient Position - Orthostatic VS BP Location FiO2 (%)   10/13/22 1137 10/13/22 0805 10/13/22 0805 10/13/22 0805 --   Oral Monitor Lying Left arm       Pain Score       10/13/22 0805       3             Orthostatic Vital Signs  Vitals:    10/13/22 0915 10/13/22 0945 10/13/22 1000 10/13/22 1030   BP:  145/82 123/78    Pulse: 94 80 (!) 111 89   Patient Position - Orthostatic VS: Lying Lying Lying        Physical Exam  Vitals and nursing note reviewed  Constitutional:       General: She is not in acute distress  Appearance: She is well-developed  HENT:      Head: Normocephalic and atraumatic  Eyes:      Conjunctiva/sclera: Conjunctivae normal    Cardiovascular:      Rate and Rhythm: Regular rhythm  Tachycardia present  Heart sounds: Murmur heard  Pulmonary:      Effort: Pulmonary effort is normal  Tachypnea present  No respiratory distress  Breath sounds: Normal breath sounds  No decreased breath sounds, wheezing, rhonchi or rales  Abdominal:      Palpations: Abdomen is soft  There is no mass  Tenderness: There is no abdominal tenderness  There is no guarding  Musculoskeletal:      Cervical back: Neck supple  Right lower leg: No tenderness  No edema  Left lower leg: No tenderness  No edema  Skin:     General: Skin is warm and dry  Capillary Refill: Capillary refill takes less than 2 seconds  Neurological:      Mental Status: She is alert  Comments: At baseline as per daughter  Patient knows where she is, her full name and    Psychiatric:         Mood and Affect: Mood normal  Mood is not anxious  Behavior: Behavior normal  Behavior is not agitated           ED Medications  Medications - No data to display    Diagnostic Studies  Results Reviewed     Procedure Component Value Units Date/Time    HS Troponin I 4hr [[id number]] Collected: 10/13/22 1324    Lab Status: No result Specimen: Blood from Line, Venous     HS Troponin I 2hr [[id number]]  (Normal) Collected: 10/13/22 1114    Lab Status: Final result Specimen: Blood from Arm, Left Updated: 10/13/22 1147     hs TnI 2hr 10 ng/L      Delta 2hr hsTnI 1 ng/L     UA w Reflex to Microscopic w Reflex to Culture [[id number]]  (Normal) Collected: 10/13/22 0951    Lab Status: Final result Specimen: Urine, Clean Catch Updated: 10/13/22 1010     Color, UA Straw     Clarity, UA Clear     Specific Gravity, UA 1 010     pH, UA 6 0     Leukocytes, UA Negative     Nitrite, UA Negative     Protein, UA Negative mg/dl      Glucose, UA Negative mg/dl      Ketones, UA Negative mg/dl      Bilirubin, UA Negative     Occult Blood, UA Negative     UROBILINOGEN UA Negative mg/dL     Phosphorus [[id number]]  (Normal) Collected: 10/13/22 0853    Lab Status: Final result Specimen: Blood from Arm, Left Updated: 10/13/22 0931     Phosphorus 3 8 mg/dL     Hepatic function panel [[id number]]  (Abnormal) Collected: 10/13/22 0853    Lab Status: Final result Specimen: Blood from Arm, Left Updated: 10/13/22 0931     Total Bilirubin 0 27 mg/dL      Bilirubin, Direct 0 00 mg/dL      Alkaline Phosphatase 123 U/L      AST 20 U/L      ALT 16 U/L      Total Protein 7 6 g/dL      Albumin 4 2 g/dL     Basic metabolic panel [[id number]]  (Abnormal) Collected: 10/13/22 0853    Lab Status: Final result Specimen: Blood from Arm, Left Updated: 10/13/22 0931     Sodium 138 mmol/L      Potassium 4 4 mmol/L      Chloride 108 mmol/L      CO2 19 mmol/L      ANION GAP 11 mmol/L      BUN 32 mg/dL      Creatinine 1 50 mg/dL      Glucose 97 mg/dL      Calcium 9 4 mg/dL      eGFR 31 ml/min/1 73sq m     Narrative:      Kailey guidelines for Chronic Kidney Disease (CKD):   •  Stage 1 with normal or high GFR (GFR > 90 mL/min/1 73 square meters)  •  Stage 2 Mild CKD (GFR = 60-89 mL/min/1 73 square meters)  •  Stage 3A Moderate CKD (GFR = 45-59 mL/min/1 73 square meters)  •  Stage 3B Moderate CKD (GFR = 30-44 mL/min/1 73 square meters)  •  Stage 4 Severe CKD (GFR = 15-29 mL/min/1 73 square meters)  •  Stage 5 End Stage CKD (GFR <15 mL/min/1 73 square meters)  Note: GFR calculation is accurate only with a steady state creatinine    Magnesium [[id number]]  (Normal) Collected: 10/13/22 0853    Lab Status: Final result Specimen: Blood from Arm, Left Updated: 10/13/22 0931     Magnesium 1 8 mg/dL     HS Troponin 0hr (reflex protocol) [[id number]]  (Normal) Collected: 10/13/22 0853    Lab Status: Final result Specimen: Blood from Arm, Left Updated: 10/13/22 0929     hs TnI 0hr 9 ng/L     CBC and differential [[id number]]  (Abnormal) Collected: 10/13/22 0853    Lab Status: Final result Specimen: Blood from Arm, Left Updated: 10/13/22 0902     WBC 7 40 Thousand/uL      RBC 3 25 Million/uL      Hemoglobin 9 6 g/dL      Hematocrit 29 1 %      MCV 90 fL      MCH 29 5 pg      MCHC 33 0 g/dL      RDW 13 7 %      MPV 10 3 fL      Platelets 858 Thousands/uL      nRBC 0 /100 WBCs      Neutrophils Relative 43 %      Immat GRANS % 0 %      Lymphocytes Relative 43 %      Monocytes Relative 11 %      Eosinophils Relative 2 %      Basophils Relative 1 %      Neutrophils Absolute 3 19 Thousands/µL      Immature Grans Absolute 0 02 Thousand/uL      Lymphocytes Absolute 3 17 Thousands/µL      Monocytes Absolute 0 80 Thousand/µL      Eosinophils Absolute 0 16 Thousand/µL      Basophils Absolute 0 06 Thousands/µL                  X-ray chest 1 view portable   Final Result by Naren Yang MD (10/13 7431)      No acute cardiopulmonary disease  Workstation performed: [id number]               Procedures  Procedures      ED Course  ED Course as of 10/13/22 1328   Thu Oct 13, 2022   0911 CBC and differential(!)   0933 Creatinine(!): 1 50           While in the ED patient remained stable  SBIRT 22yo+    Flowsheet Row Most Recent Value   SBIRT (25 yo +)    In order to provide better care to our patients, we are screening all of our patients for alcohol and drug use  Would it be okay to ask you these screening questions? Yes Filed at: 10/13/2022 0312   Initial Alcohol Screen: US AUDIT-C     1  How often do you have a drink containing alcohol? 0 Filed at: 10/13/2022 0827   2   How many drinks containing alcohol do you have on a typical day you are drinking? 0 Filed at: 10/13/2022 0827   3a  Male UNDER 65: How often do you have five or more drinks on one occasion? 0 Filed at: 10/13/2022 0827   3b  FEMALE Any Age, or MALE 65+: How often do you have 4 or more drinks on one occassion? 0 Filed at: 10/13/2022 0827   Audit-C Score 0 Filed at: 10/13/2022 5433   FRANK: How many times in the past year have you    Used an illegal drug or used a prescription medication for non-medical reasons? Never Filed at: 10/13/2022 0827                MDM  Number of Diagnoses or Management Options  Chest pain, unspecified type  Diagnosis management comments: 80 y o  presenting for chest pain and tachypnea since waking up this morning  While in the ED patient remained tachypneic, but other vital signs were stable  Lungs were clear on auscultation  She had an episode of rectal bleeding around 12:45 pm likely secondary to hemorrhoids  Patient remained hemodynamically stable  CBC reviewed hb of 9 6 compatible with patient's baseline  Chest x-ray did not show any signs of acute cardiopulmonary diseases  ECG and troponin were negative for ACS  Patient was admitted at [facility] service for observation overnight  Disposition  Final diagnoses:   Chest pain, unspecified type     Time reflects when diagnosis was documented in both MDM as applicable and the Disposition within this note     Time User Action Codes Description Comment    10/13/2022  1:20 PM Juan Faye Add [R07 9] Chest pain, unspecified type     10/13/2022  1:26 PM Liban Almendarez Add [K62 5] Rectal bleeding       ED Disposition     ED Disposition   Admit    Condition   Stable    Date/Time   Thu Oct 13, 2022  1:20 PM    Comment   Case was discussed with Dr Eve Quintana and Dr Jean-Pierre Diaz and the patient's admission status was agreed to be Admission Status: observation status to the service of Dr Anders Harris              Follow-up Information    None         Patient's Medications Discharge Prescriptions    No medications on file     No discharge procedures on file  PDMP Review     None           ED Provider  Attending physically available and evaluated Juliocesarantwan Laughlin  YUDY managed the patient along with the ED Attending      Electronically Signed by         Yang Hardy MD  10/13/22 7117

## 2022-10-13 NOTE — ASSESSMENT & PLAN NOTE
Lab Results   Component Value Date    EGFR 31 10/13/2022    EGFR 30 07/05/2022    EGFR 22 07/04/2022    CREATININE 1 50 (H) 10/13/2022    CREATININE 1 54 (H) 07/05/2022    CREATININE 1 98 (H) 07/04/2022     On nephroprotective BP med- Losartan daily  GFR stable      - Avoid nephrotoxic medications

## 2022-10-13 NOTE — ASSESSMENT & PLAN NOTE
Hx of dementia  Home meds: Memantine 10 mg tablet bid        - Continue home meds  - Maintain normal sleep/wake cycle  - Maintain normal urine and bowel functions with urinary retention protocol and bowel regimen  - Avoid all BEER's criteria drugs as much as possible  - Maintain adequate hydration   - Maintain daily orientation to environment and encourage family at bedside   - Avoid physical and, if possible, chemical restraints

## 2022-10-13 NOTE — ED ATTENDING ATTESTATION
10/13/2022  I, Pablo Paredes MD, saw and evaluated the patient  I have discussed the patient with the resident/non-physician practitioner and agree with the resident's/non-physician practitioner's findings, Plan of Care, and MDM as documented in the resident's/non-physician practitioner's note, except where noted  All available labs and Radiology studies were reviewed  I was present for key portions of any procedure(s) performed by the resident/non-physician practitioner and I was immediately available to provide assistance  At this point I agree with the current assessment done in the Emergency Department  I have conducted an independent evaluation of this patient a history and physical is as follows:    77-year-old female presenting with shortness of breath and chest pain  Symptoms began suddenly this morning and have since improved  Patient currently reporting that she feels well on exam, however she does appear tachypneic and is mildly tachycardic  Apparently these symptoms in the past have been attributed to anxiety  On exam, patient has rhythmic movements of the mouth, consistent with tardive dyskinesia  She is mildly tachycardic with a regular rhythm, with an atrial murmur, 3/6 on auscultation  Her abdomen is soft, nontender  Lungs are clear to auscultation  She does not have any peripheral edema noted  Would consider cardiac etiology versus pneumonia versus electrolyte derangement  Labs sent  EKG with sinus tachycardia  ED Course     Patient felt improved throughout ED stay, but remained mildly tachypneic and tachycardic  She had a stable troponin on 2 hour repeat  Discussed shared decision making with the patient's daughter, and decision was made to admit the patient for observation and further management  Just prior to this decision, patient did have 1 episode of bright red blood per rectum    Per nursing, had a small portion of prolapsed rectum visible immediately after episode  On my evaluation, any prolapse had already resolved spontaneously  Patient had some raw appearing skin around the anus, but no visible hemorrhoids or fissures      Critical Care Time  Procedures

## 2022-10-13 NOTE — ASSESSMENT & PLAN NOTE
Hgb on admission 9 6  Baseline 9 2  BRBPR on admission and with BM  See A& P rectal bleeding    - AM CBC  - Closely monitor BM/watch for rectal bleed

## 2022-10-13 NOTE — CONSULTS
Consultation - Geriatric Medicine   Asskia Session 80 y o  female MRN: [id number]  Unit/Bed#: 7T [facility] 704-01 Encounter: [id number]      Assessment/Plan     Ambulatory dysfunction  Assessment & Plan  Patient with reported falls in the past year  Monitor orthostatic vital signs  Consult PT/OT  Placed on fall precautions  Encourage p o  intake  Avoid hypotension    Blindness of left eye  Assessment & Plan  Patient not able to see with left eye    Place personal objects at reach  Provide supportive care    At risk for delirium  Assessment & Plan    -Patient is high risk of delirium due to cognitive impairment, vision loss, change in environment, anemia, pain, insomnia  -Initiate delirium precautions  -maintain normal sleep/wake cycle, add melatonin 3 mg q h s , decrease Seroquel to 25 mg q h s  and taper off as tolerated  -minimize overnight interruptions, group overnight vitals/labs/nursing checks as possible  -dim lights, close blinds and turn off tv to minimize stimulation and encourage sleep environment in evenings  -ensure that pain is well controlled, consider Tylenol 975mg Q8H scheduled   -monitor for fecal and urinary retention which may precipitate delirium  -encourage early mobilization and ambulation  -provide frequent reorientation and redirection  -encourage family and friends at the bedside to help calm patient if anxious  -avoid medications which may precipitate or worsen delirium such as tramadol, benzodiazepine, anticholinergics, and antihistaminics  -encourage hydration and nutrition , assist with feeding if needed  -redirect unwanted behaviors as first line, avoid physical restraints  - consider starting low-dose gabapentin if patient noted to be anxious  - if patient becomes severely agitated and not taking p o  medication I would recommend Zyprexa 2 5 mg IM q 12 hours as needed  - patient noted to have tongue protrusion, avoid 1st generation antipsychotics and taper off antipsychotics as tolerated  - monitor EKG periodically for QTC prolongation    Anemia  Assessment & Plan  Hemoglobin 9 6  Patient noted to have blood per rectum- continue management as per primary team and GI    Dementia [facility])  Assessment & Plan  Patient with known history of dementia she needs assistance with will IADLs and some of her ADLs  She was alert oriented x1 at the time of encounter and this seems to be her baseline  She was able to recite the days of the week backwards  Patient is at high risk for delirium- Place on delirium precautions  I will recommend decrease memantine to 10 mg daily due to decreased renal function  Per discussion with daughter patient does not have aggressive behavior- recommend decrease Seroquel to 25 mg q h s  Maintain sleep-wake cycle add melatonin 3 mg q h s  Reorient as needed and provide supportive care  Continue to encourage p o  intake  Encourage out of bed as tolerated and consult PT/OT    HTN (hypertension)  Assessment & Plan  Continue home regimen with holding parameters  Avoid hypotension    * Chest pain  Assessment & Plan  Patient admitted for chest pain  Continue management as per primary team and Cardiology            History of Present Illness   Physician Requesting Consult: Dinesh Mclean MD  Reason for Consult / Principal Problem: dementia, polypharmacy  Hx and PE limited by:  Cognitive impairment  Additional history obtained from:  Daughter present at bedside      HPI: Essie Severs is a 80y o  year old female who presents with chest pain, shortness a breath, nausea, headache, decreased appetite  She was admitted for observation for cardiac workup  Geriatrics was consulted due to dementia and polypharmacy  At the time of encounter patient was alert oriented x1 which seems to be her baseline  She denied chest pain nausea dizziness  Patient reports poor appetite, denies difficulty swallowing  She reports abdominal pain at the time of encounter  Denies constipation dysuria hematuria  Patient is a poor historian most of the history was obtained from her daughter who is her main caregiver  Her daughter states that patient started having cognitive decline about 3 years ago when her    She states that the patient has depression and anxiety, denied any aggressive  behaviors  States that her mom has difficulty sleeping and that is why she is taking Seroquel  Patient needs assistance with all IADLs and some of her ADLs  Per daughter reports  A fall about 6 months ago with no injury  States that her mom usually has a good appetite except for today  No changes in weight noted  Blood in stool was noted today on admission to the emergency room  Per daughter this is the 1st occurrence  Inpatient consult to Gerontology  Consult performed by: Selina Turner MD  Consult ordered by: Tamika Arriaga MD          Review of Systems   Constitutional: Positive for appetite change  Negative for chills and fever  HENT: Negative for congestion, rhinorrhea and trouble swallowing  Eyes: Positive for visual disturbance  Respiratory: Negative for cough, shortness of breath and wheezing  Cardiovascular: Positive for chest pain (Intermittent)  Negative for palpitations and leg swelling  Gastrointestinal: Positive for abdominal pain  Negative for constipation  Endocrine: Negative for cold intolerance  Genitourinary: Negative for difficulty urinating, dysuria and hematuria  Musculoskeletal: Positive for gait problem  Skin: Negative for wound  Allergic/Immunologic: Negative for environmental allergies  Neurological: Negative for dizziness and seizures  Hematological: Does not bruise/bleed easily  Psychiatric/Behavioral: Negative for behavioral problems and sleep disturbance  Historical Information   Past Medical History:   Diagnosis Date   • Dementia ([facility] [address] )    • HLD (hyperlipidemia)    • HTN (hypertension)    • Vaginal prolapse      History reviewed   No pertinent surgical history  Social History   Social History     Substance and Sexual Activity   Alcohol Use Never     Social History     Substance and Sexual Activity   Drug Use Never     Social History     Tobacco Use   Smoking Status Never Smoker   Smokeless Tobacco Never Used     Family History: History reviewed  No pertinent family history  Meds/Allergies   all current active meds have been reviewed     Home medication reviewed with daughter  Amlodipine 10 mg and  Bisoprolol 2 5 mg and  Losartan 100 mg in  Memantine 20 mg daily  Pravastatin 10 mg daily and  Seroquel 50 mg daily     No Known Allergies    Objective     Intake/Output Summary (Last 24 hours) at 10/13/2022 2057  Last data filed at 10/13/2022 1700  Gross per 24 hour   Intake 240 ml   Output --   Net 240 ml     Invasive Devices  Report    Peripheral Intravenous Line  Duration           Peripheral IV 10/13/22 Left Antecubital <1 day                Physical Exam  Vitals and nursing note reviewed  Constitutional:       General: She is not in acute distress  Appearance: She is well-developed  HENT:      Head: Normocephalic and atraumatic  Mouth/Throat:      Mouth: Mucous membranes are dry  Eyes:      Conjunctiva/sclera: Conjunctivae normal    Cardiovascular:      Rate and Rhythm: Normal rate and regular rhythm  Heart sounds: No murmur heard  Pulmonary:      Effort: Pulmonary effort is normal  No respiratory distress  Breath sounds: Normal breath sounds  Abdominal:      Palpations: Abdomen is soft  Tenderness: There is no abdominal tenderness  Musculoskeletal:         General: No tenderness  Cervical back: Neck supple  Right lower leg: No edema  Left lower leg: No edema  Skin:     General: Skin is warm and dry  Neurological:      Mental Status: She is alert  Mental status is at baseline        Comments: Alert oriented x1  Able to tell me the days of the week backwards    Tongue protrusion Psychiatric:         Behavior: Behavior normal          Lab Results:   I have personally reviewed pertinent lab results including the following:  - CBC CMP    I have personally reviewed the following imaging study reports in PACS:  - EKG        VTE Prophylaxis: Sequential compression device (Venodyne)     Code Status: Level 3 - DNAR and DNI    Family and Social Support:  Daughter  Living Arrangements: Lives w/ Children  Support Systems: Children  Assistance Needed: adl  Type of Current Residence: Private residence  [address]: No      Goals of Care:  Return home with her daughter    Thank you for allowing me to participate in your patients' care  Please do not hesitate to call with any additional questions    Alka Olivera MD

## 2022-10-14 VITALS
HEART RATE: 76 BPM | SYSTOLIC BLOOD PRESSURE: 122 MMHG | WEIGHT: 121.25 LBS | OXYGEN SATURATION: 97 % | DIASTOLIC BLOOD PRESSURE: 68 MMHG | HEIGHT: 59 IN | RESPIRATION RATE: 20 BRPM | BODY MASS INDEX: 24.44 KG/M2 | TEMPERATURE: 97.8 F

## 2022-10-14 PROBLEM — K62.5 BRIGHT RED BLOOD PER RECTUM: Status: RESOLVED | Noted: 2022-10-13 | Resolved: 2022-10-14

## 2022-10-14 PROBLEM — R07.9 CHEST PAIN: Status: RESOLVED | Noted: 2022-07-05 | Resolved: 2022-10-14

## 2022-10-14 PROBLEM — K60.2 RECTAL FISSURE: Status: ACTIVE | Noted: 2022-10-14

## 2022-10-14 LAB
ANION GAP SERPL CALCULATED.3IONS-SCNC: 9 MMOL/L (ref 5–14)
BUN SERPL-MCNC: 31 MG/DL (ref 5–25)
CALCIUM SERPL-MCNC: 8.9 MG/DL (ref 8.4–10.2)
CHLORIDE SERPL-SCNC: 108 MMOL/L (ref 96–108)
CO2 SERPL-SCNC: 21 MMOL/L (ref 21–32)
CREAT SERPL-MCNC: 1.51 MG/DL (ref 0.6–1.2)
ERYTHROCYTE [DISTWIDTH] IN BLOOD BY AUTOMATED COUNT: 13.8 % (ref 11.6–15.1)
GFR SERPL CREATININE-BSD FRML MDRD: 30 ML/MIN/1.73SQ M
GLUCOSE P FAST SERPL-MCNC: 97 MG/DL (ref 70–99)
GLUCOSE SERPL-MCNC: 97 MG/DL (ref 70–99)
HCT VFR BLD AUTO: 29.5 % (ref 34.8–46.1)
HGB BLD-MCNC: 9.9 G/DL (ref 11.5–15.4)
MCH RBC QN AUTO: 30.2 PG (ref 26.8–34.3)
MCHC RBC AUTO-ENTMCNC: 33.6 G/DL (ref 31.4–37.4)
MCV RBC AUTO: 90 FL (ref 82–98)
PLATELET # BLD AUTO: 316 THOUSANDS/UL (ref 149–390)
PMV BLD AUTO: 10.3 FL (ref 8.9–12.7)
POTASSIUM SERPL-SCNC: 4.5 MMOL/L (ref 3.5–5.3)
RBC # BLD AUTO: 3.28 MILLION/UL (ref 3.81–5.12)
SODIUM SERPL-SCNC: 138 MMOL/L (ref 135–147)
WBC # BLD AUTO: 6.73 THOUSAND/UL (ref 4.31–10.16)

## 2022-10-14 PROCEDURE — 99219 PR INITIAL OBSERVATION CARE/DAY 50 MINUTES: CPT | Performed by: FAMILY MEDICINE

## 2022-10-14 PROCEDURE — 97162 PT EVAL MOD COMPLEX 30 MIN: CPT

## 2022-10-14 PROCEDURE — 85027 COMPLETE CBC AUTOMATED: CPT

## 2022-10-14 PROCEDURE — 80048 BASIC METABOLIC PNL TOTAL CA: CPT

## 2022-10-14 PROCEDURE — 97166 OT EVAL MOD COMPLEX 45 MIN: CPT

## 2022-10-14 PROCEDURE — NC001 PR NO CHARGE: Performed by: FAMILY MEDICINE

## 2022-10-14 RX ORDER — QUETIAPINE FUMARATE 25 MG/1
25 TABLET, FILM COATED ORAL ONCE
Status: DISCONTINUED | OUTPATIENT
Start: 2022-10-14 | End: 2022-10-14

## 2022-10-14 RX ORDER — QUETIAPINE FUMARATE 25 MG/1
25 TABLET, FILM COATED ORAL
Status: DISCONTINUED | OUTPATIENT
Start: 2022-10-14 | End: 2022-10-14 | Stop reason: HOSPADM

## 2022-10-14 RX ORDER — LANOLIN ALCOHOL/MO/W.PET/CERES
3 CREAM (GRAM) TOPICAL
Qty: 30 TABLET | Refills: 0 | Status: SHIPPED | OUTPATIENT
Start: 2022-10-14 | End: 2022-10-19 | Stop reason: SDUPTHER

## 2022-10-14 RX ORDER — HYDROCORTISONE 25 MG/G
CREAM TOPICAL 2 TIMES DAILY
Qty: 28 G | Refills: 0 | Status: SHIPPED | OUTPATIENT
Start: 2022-10-14

## 2022-10-14 RX ORDER — QUETIAPINE FUMARATE 25 MG/1
25 TABLET, FILM COATED ORAL
Qty: 3 TABLET | Refills: 0 | Status: SHIPPED | OUTPATIENT
Start: 2022-10-14 | End: 2022-10-19 | Stop reason: SDUPTHER

## 2022-10-14 RX ORDER — GABAPENTIN 100 MG/1
100 CAPSULE ORAL
Qty: 30 CAPSULE | Refills: 0 | Status: SHIPPED | OUTPATIENT
Start: 2022-10-14 | End: 2022-10-19 | Stop reason: SDUPTHER

## 2022-10-14 RX ADMIN — MEMANTINE 10 MG: 5 TABLET ORAL at 08:41

## 2022-10-14 RX ADMIN — LOSARTAN POTASSIUM 100 MG: 50 TABLET, FILM COATED ORAL at 08:44

## 2022-10-14 RX ADMIN — HYDROCORTISONE: 25 CREAM TOPICAL at 08:46

## 2022-10-14 RX ADMIN — BISOPROLOL FUMARATE 2.5 MG: 5 TABLET ORAL at 08:43

## 2022-10-14 RX ADMIN — AMLODIPINE BESYLATE 10 MG: 10 TABLET ORAL at 08:41

## 2022-10-14 NOTE — CASE MANAGEMENT
Case Management Discharge Planning Note    Patient name Satish Thacker  Location 7T U 704/7T U 704-01 MRN [id number]  : 1934 Date 10/14/2022       Current Admission Date: 10/13/2022  Current Admission Diagnosis:HTN (hypertension)   Patient Active Problem List    Diagnosis Date Noted   • Rectal fissure 10/14/2022   • Anemia 10/13/2022   • At risk for delirium 10/13/2022   • Blindness of left eye 10/13/2022   • Ambulatory dysfunction 10/13/2022   • Dementia [facility])    • Vaginal prolapse    • Fecal smearing    • SIRS (systemic inflammatory response syndrome) ([facility] [address] ) 2022   • Confusion 2022   • HTN (hypertension) 2022   • Chronic kidney disease 2022      LOS (days): 0  Geometric Mean LOS (GMLOS) (days):   Days to GMLOS:     OBJECTIVE:            Current admission status: Observation   Preferred Pharmacy:   [facility],[facility] Floor [address]  Phone: [phone] Fax: [phone] [address] [facility], [address] [facility]  [address] [address]  Phone: [phone] Fax: [phone]    Primary Care Provider: Melissa Ramos MD    Primary Insurance: [address] [place] [address]  Secondary Insurance:     DISCHARGE DETAILS:    Discharge planning discussed with[de-identified] Family at Pts bedside     Freedom of Choice: Yes  Comments - Freedom of Choice: Pt will discharge home with her Dtr and BONNIE  Family requesting nursing or caregiver services  CM provided [employer] for [employer] and [place] on A[facility]  Family also provided information for [address] [place]  Dtr informed medical staff that they have recently moved here form [place], and establishing medical team in [facility]  CM also provided family with a list of private pay [facility] agencies  Pt will not be discharging with needs that would require VNA or home therapy needs       CM contacted family/caregiver?: Yes (Dtr at bedside)  Were Treatment Team discharge recommendations reviewed with patient/caregiver?: Yes  Did patient/caregiver verbalize understanding of patient care needs?: Yes  Were patient/caregiver advised of the risks associated with not following Treatment Team discharge recommendations?: Yes    Other Referral/Resources/Interventions Provided:  Government Services[de-identified] [facility] Agency on Aging, Medical Assistance    Discharge Destination Plan[de-identified] Home  Transport at Discharge : Automobile  Transfer Mode: Ambulate  Accompanied by: Family member     Pt is discharging with meds to beds- indigent medications total cost $45 77 and email to [facility] hospital counselors to confirm Pts eligibility for [facility]

## 2022-10-14 NOTE — ASSESSMENT & PLAN NOTE
Hemoglobin 9 6  Patient noted to have blood per rectum- continue management as per primary team and GI

## 2022-10-14 NOTE — DISCHARGE SUMMARY
Discharge Summary - Jayla Caldwell    Patient Information: Katarina Johnston 80 y o  female MRN: [id number]  Unit/Bed#: 7T [facility] 704-01 Encounter: [id number]    Discharging Physician / Practitioner: Femi Arnold  PCP: Evelia Tirado MD  Admission Date:   Admission Orders (From admission, onward)     Ordered        10/13/22 1321  Place in Observation  Once                      Discharge Date: 10/14/2022    Reason for Admission: Chest pain; Rule out ACS    Discharge Diagnoses:     Principal Problem:    Chest pain  Active Problems:    Bright red blood per rectum    Dementia ([address] )    Anemia    HTN (hypertension)    Chronic kidney disease    At risk for delirium    Blindness of left eye    Ambulatory dysfunction  Resolved Problems:    * No resolved hospital problems  *        * Chest pain  Assessment & Plan  10/13/22: ED presentation for chest pain   Hx of DM, CKD, HTN, HLD, Cerebrovascular disease  10/13/2022- EKG- Normal  Trops  X 1 neg   Last Stress echo on 7/5/2022, significant for mild to moderate mitral valve regurgitation and concentric left ventricular hypertrophy, and mild pulmonary hypertension  Chest pain of similar nature noted during pcp visit on 9/30/2022 related to anxiety  Home medications: Bisoprolol 5 mg tablet QD; amlodipine 10 mg tablet QD; Losartan 100 mg qd, Pravastatin 80mg qd  No c/o chest pain today 10/14/2022    - Continue home HTN meds  - Continue statin  - Plan for discharge      Bright red blood per rectum  Assessment & Plan  Per ED nurse's report, patient had bright red blood per rectum after bowel movement in the ED  ED nurse noted a rectal prolapse that was reducible; bleeding stopped after prolapse was reduced  Anal fissure appreciated on exam; trace stool on DOMINIC  No blood appreciated on DOMINIC     No active bleeding  Negative hemoccult test  10/13- Hgb:9 6  Hx of DVT; was on Plavix 75 mg daily but stopped taking a month ago  Per daughter, last colonoscopy was 4 years ago and was not aware of any abnormalities      - Start topical Hydrocortisone 2 6% and apply bid to affected area  - Maintain adequate fluid and fiber intake  - Consider enemas or suppositories if constipated  - Hold plavix  - Hgb and Hct tonight  - AM CBC      Dementia ([facility] [address] )  Assessment & Plan  Hx of dementia  Home meds: Memantine 10 mg tablet bid        - Continue home meds  - Maintain normal sleep/wake cycle  - Maintain normal urine and bowel functions with urinary retention protocol and bowel regimen  - Avoid all BEER's criteria drugs as much as possible  - Maintain adequate hydration   - Maintain daily orientation to environment and encourage family at bedside   - Avoid physical and, if possible, chemical restraints  Anemia  Assessment & Plan  Hgb on admission 9 6  Baseline 9 2  BRBPR on admission and with BM  See A& P rectal bleeding    - AM CBC  - Closely monitor BM/watch for rectal bleed    HTN (hypertension)  Assessment & Plan  ED BP: 163/82; at goal  Home medications: Bisoprolol 5 mg tablet QD; amlodipine 10 mg tablet QD;  Losartan 100 mg qd    - Continue home meds  - Monitor BP            Chronic kidney disease  Assessment & Plan  Lab Results   Component Value Date    EGFR 31 10/13/2022    EGFR 30 07/05/2022    EGFR 22 07/04/2022    CREATININE 1 50 (H) 10/13/2022    CREATININE 1 54 (H) 07/05/2022    CREATININE 1 98 (H) 07/04/2022     On nephroprotective BP med- Losartan daily  GFR stable      - Avoid nephrotoxic medications       Consultations During Hospital Stay:  · Geriatric medicine    Procedures Performed:   · No    Significant Findings / Test Results:   · No    Incidental Findings:   · No     Test Results Pending at Discharge (will require follow up):   · No     Outpatient Tests Requested:  · No    Outpatient follow-up Requested:  • PCP  • Geriatric    Complications:  None    Hospital Course:     Tha Coy is a 80 y o  female patient with a past medical history of hypertension, Alzheimer's dementia, CKD, and hyperlipidemia who presents to the ED on 10/13/2022 due to chest pain that began in the morning  Her daughter provided the history today and stated that the pain is a/w anxiety, and patient often gets these symptoms with hyperventilation  Chest pain worsens when palpated but does not radiate  Her last episode was 9/30/2022  Her daughter denies chills, fever, diaphoresis, arm or jaw pain, back pain, epigastric pain, or SOB  She was admitted to the [facility] Medicine service to rule out ACS  In the hospital, she was very agitated and confused  Family member not at bedside overnight and were contacted to reorient the patient  In the morning of her discharge, she seemed calm and cooperative and was oriented to person  Discussed management, discharge plan and follow up plans with daughter  Condition at Discharge: good     Discharge Day Visit / Exam:     Vitals: Blood Pressure: 122/68 (10/14/22 0808)  Pulse: 76 (10/14/22 0808)  Temperature: 97 8 °F (36 6 °C) (10/14/22 0808)  Temp Source: Temporal (10/14/22 0808)  Respirations: 20 (10/14/22 0808)  Height: 4' 11" (149 9 cm) (10/13/22 1406)  Weight - Scale: 55 kg (121 lb 4 1 oz) (10/13/22 1406)  SpO2: 97 % (10/14/22 2932)  Exam:   Physical Exam  Vitals reviewed  Constitutional:       General: She is not in acute distress  Appearance: Normal appearance  She is normal weight  She is not ill-appearing, toxic-appearing or diaphoretic  HENT:      Head: Normocephalic  Right Ear: External ear normal       Left Ear: External ear normal       Nose: Nose normal       Mouth/Throat:      Mouth: Mucous membranes are moist    Eyes:      Extraocular Movements: Extraocular movements intact  Cardiovascular:      Rate and Rhythm: Normal rate and regular rhythm  Pulses: Normal pulses  Heart sounds: Murmur heard  No friction rub  No gallop     Pulmonary:      Effort: Pulmonary effort is normal    Abdominal:      General: Bowel sounds are normal       Palpations: Abdomen is soft  There is no mass  Tenderness: There is no rebound  Musculoskeletal:      Right lower leg: No edema  Left lower leg: No edema  Skin:     General: Skin is warm  Neurological:      General: No focal deficit present  Mental Status: She is alert and oriented to person, place, and time  Psychiatric:         Cognition and Memory: Cognition is impaired  Comments: Alzheimer's dementia       Discussion with Family: Yes  Patient Information Sharing: With the consent of Ian Care , their loved ones Judy Tena were notified today by inpatient team of the patient’s condition and current plan  All questions answered  Discharge instructions/Information to patient and family:   See after visit summary for information provided to patient and family  Discharge Medications:  Current Outpatient Medications   Medication Instructions   • amLODIPine (NORVASC) 10 mg, Oral, Daily   • bisoprolol (ZEBETA) 2 5 mg, Oral, Daily   • clopidogrel (PLAVIX) 75 mg, Oral, Daily   • Diclofenac Sodium (VOLTAREN) 4 g, Apply externally, 2 times daily   • diphenhydrAMINE-acetaminophen (TYLENOL PM)  MG TABS 1 tablet, Daily at bedtime PRN   • hydrocortisone 2 5 % cream Apply to anus 3-4 times a day as needed for pain  • losartan (COZAAR) 100 mg, Oral, Daily   • memantine (NAMENDA) 20 mg, Oral, Daily   • psyllium (METAMUCIL) 3 4 g, Daily   • QUEtiapine (SEROQUEL) 50 mg, Oral, Daily   • rosuvastatin (CRESTOR) 10 mg, Oral, Daily       Provisions for Follow-Up Care:  See after visit summary for information related to follow-up care and any pertinent home health orders  Disposition:     Home    For Discharges to   [address] [facility]:   · Not Applicable to this Patient - Not Applicable to this Patient    Planned Readmission: No     Discharge Statement:  I spent 60 minutes discharging the patient  This time was spent on the day of discharge   I had direct contact with the patient on the day of discharge  Greater than 50% of the total time was spent examining patient, answering all patient questions, arranging and discussing plan of care with patient as well as directly providing post-discharge instructions  Additional time then spent on discharge activities        Teena Márquez  10/14/22  12:16 PM

## 2022-10-14 NOTE — ASSESSMENT & PLAN NOTE
Patient with known history of dementia she needs assistance with will IADLs and some of her ADLs  She was alert oriented x1 at the time of encounter and this seems to be her baseline  She was able to recite the days of the week backwards  Patient is at high risk for delirium- Place on delirium precautions  I will recommend decrease memantine to 10 mg daily due to decreased renal function  Per discussion with daughter patient does not have aggressive behavior- recommend decrease Seroquel to 25 mg q h s  Maintain sleep-wake cycle add melatonin 3 mg q h s    Reorient as needed and provide supportive care  Continue to encourage p o  intake  Encourage out of bed as tolerated and consult PT/OT

## 2022-10-14 NOTE — OCCUPATIONAL THERAPY NOTE
Occupational Therapy Evaluation     Patient Name: Thurlow Cockayne  OXGVT'M Date: 10/14/2022  Problem List  Active Problems:    HTN (hypertension)    Chronic kidney disease    Dementia ([facility] [address] )    Anemia    At risk for delirium    Blindness of left eye    Ambulatory dysfunction    Rectal fissure    Past Medical History  Past Medical History:   Diagnosis Date   • Dementia ([facility] [address] )    • HLD (hyperlipidemia)    • HTN (hypertension)    • Vaginal prolapse      Past Surgical History  History reviewed  No pertinent surgical history  10/14/22 1415   OT Last Visit   OT Visit Date 10/14/22   Note Type   Note type Evaluation   Pain Assessment   Pain Assessment Tool 0-10   Pain Score No Pain   Restrictions/Precautions   Other Precautions Cognitive   Home Living   Additional Comments limited historian   Prior Function   Level of [place] Independent with functional mobility; Needs assistance with ADLs; Independent with [facility] [address]  Help From Family   Lifestyle   Reciprocal Relationships supportive family   ADL   Eating Assistance 7  Independent   Grooming Assistance 5  Supervision/Setup   UB Bathing Assistance 5  Supervision/Setup   LB Bathing Assistance 5  Supervision/Setup   UB Dressing Assistance 5  Supervision/Setup   LB Dressing Assistance 5  Supervision/Setup   Toileting Assistance  5  Supervision/Setup   Bed Mobility   Additional Comments Pt received OOB   Transfers   Sit to Stand 5  Supervision   Additional items Armrests; Increased time required   Stand to Sit 5  Supervision   Additional items Armrests; Increased time required   Additional Comments performed with RW   Functional Mobility   Functional Mobility 5  Supervision   Additional Comments household distances   Additional items Rolling walker   Balance   Static Sitting Fair +   Dynamic Sitting Fair +   Static Standing Fair   Dynamic Standing Fair   Ambulatory Fair   Activity Tolerance   Activity Tolerance Patient tolerated treatment well Medical Staff Made Aware spoke to CM   Nurse Made Aware spoke to RN   RUE Assessment   RUE Assessment WFL   LUE Assessment   LUE Assessment WFL   Perception   Inattention/Neglect Appears intact   Cognition   Arousal/Participation Cooperative   Attention Attends with cues to redirect   Memory Decreased short term memory;Decreased recall of recent events;Decreased recall of precautions   Following Commands Follows one step commands with increased time or repetition   Comments PMH: dementia, confusion   Assessment   Limitation Decreased ADL status; Decreased self-care trans;Decreased high-level ADLs   Assessment Pt is a 80 y o  female seen for OT evaluation s/p admit to [facility] on 10/13/2022 w/ Chest pain  OT completed an extensive review of pt's medical and social history  Pt  has a past medical history of Dementia ([address] ), HLD (hyperlipidemia), HTN (hypertension), and Vaginal prolapse  Previously receiving A from family with ADLs/IADLs  Confirms assist upon d/c  Upon evaluation, pt requires S with transfers and mobility  Pt currently requires Supervision with ADLs  Pt appears to be functioning at baseline  Based on OT evaluation, assessment(s), performance deficits listed, and current level of function, pt identified as a Moderate complexity evaluation  From OT standpoint, recommendation at time of d/c would be home with social support  No IP OT needs at this time      Goals   Patient Goals none stated   Plan   OT Frequency Eval only   Recommendation   OT Discharge Recommendation No rehabilitation needs   AM-PAC Daily Activity Inpatient   Lower Body Dressing 3   Bathing 3   Toileting 3   Upper Body Dressing 3   Grooming 4   Eating 4   Daily Activity Raw Score 20   Daily Activity Standardized Score (Calc for Raw Score >=11) 42 03   End of Consult   Patient Position at End of Consult Bedside chair            [facility], MS, OTR/L

## 2022-10-14 NOTE — PLAN OF CARE
Problem: Potential for Falls  Goal: Patient will remain free of falls  Description: INTERVENTIONS:  - Educate patient/family on patient safety including physical limitations  - Instruct patient to call for assistance with activity   - Consult OT/PT to assist with strengthening/mobility   - Keep Call bell within reach  - Keep bed low and locked with side rails adjusted as appropriate  - Keep care items and personal belongings within reach  - Initiate and maintain comfort rounds  - Make Fall Risk Sign visible to staff  - Offer Toileting every 4Hours, in advance of need  - Initiate/Maintain alarm    - Apply yellow socks and bracelet for high fall risk patients  - Consider moving patient to room near nurses station  10/14/2022 1827 by Katya Mendoza RN  Outcome: Adequate for Discharge  10/14/2022 0920 by Katya Mendoza RN  Outcome: Progressing     Problem: Prexisting or High Potential for Compromised Skin Integrity  Goal: Skin integrity is maintained or improved  Description: INTERVENTIONS:  - Identify patients at risk for skin breakdown  - Assess and monitor skin integrity  - Assess and monitor nutrition and hydration status  - Monitor labs   - Assess for incontinence   - Turn and reposition patient  - Assist with mobility/ambulation  - Relieve pressure over bony prominences  - Avoid friction and shearing  - Provide appropriate hygiene as needed including keeping skin clean and dry  - Evaluate need for skin moisturizer/barrier cream  - Collaborate with interdisciplinary team   - Patient/family teaching    10/14/2022 1827 by Katya Mendoza RN  Outcome: Adequate for Discharge  10/14/2022 0920 by Katya Mendoza RN  Outcome: Progressing     Problem: PAIN - ADULT  Goal: Verbalizes/displays adequate comfort level or baseline comfort level  Description: Interventions:  - Encourage patient to monitor pain and request assistance  - Assess pain using appropriate pain scale  - Administer analgesics based on type and severity of pain and evaluate response  - Implement non-pharmacological measures as appropriate and evaluate response  - Consider cultural and social influences on pain and pain management  - Notify physician/advanced practitioner if interventions unsuccessful or patient reports new pain  10/14/2022 1827 by Georgetta Mortimer, RN  Outcome: Adequate for Discharge  10/14/2022 0920 by Georgetta Mortimer, RN  Outcome: Progressing     Problem: INFECTION - ADULT  Goal: Absence or prevention of progression during hospitalization  Description: INTERVENTIONS:  - Assess and monitor for signs and symptoms of infection  - Monitor lab/diagnostic results  - Monitor all insertion sites, i e  indwelling lines, tubes, and drains    - Administer medications as ordered  - Instruct and encourage patient and family to use good hand hygiene technique  - Identify and instruct in appropriate isolation precautions for identified infection/condition  10/14/2022 1827 by Georgetta Mortimer, RN  Outcome: Adequate for Discharge  10/14/2022 0920 by Georgetta Mortimer, RN  Outcome: Progressing  Goal: Absence of fever/infection during neutropenic period  Description: INTERVENTIONS:  - Monitor WBC    10/14/2022 1827 by Georgetta Mortimer, RN  Outcome: Adequate for Discharge  10/14/2022 0920 by Georgetta Mortimer, RN  Outcome: Progressing     Problem: SAFETY ADULT  Goal: Patient will remain free of falls  Description: INTERVENTIONS:  - Educate patient/family on patient safety including physical limitations  - Instruct patient to call for assistance with activity   - Consult OT/PT to assist with strengthening/mobility   - Keep Call bell within reach  - Keep bed low and locked with side rails adjusted as appropriate  - Keep care items and personal belongings within reach  - Initiate and maintain comfort rounds  - Make Fall Risk Sign visible to staff  - Offer Toileting every 4Hours, in advance of need  - Initiate/Maintain alarm    - Apply yellow socks and bracelet for high fall risk patients  - Consider moving patient to room near nurses station  10/14/2022 1827 by Georgetta Mortimer, RN  Outcome: Adequate for Discharge  10/14/2022 0920 by Georgetta Mortimer, RN  Outcome: Progressing  Goal: Maintain or return to baseline ADL function  Description: INTERVENTIONS:  -  Assess patient's ability to carry out ADLs; assess patient's baseline for ADL function and identify physical deficits which impact ability to perform ADLs (bathing, care of mouth/teeth, toileting, grooming, dressing, etc )  - Assess/evaluate cause of self-care deficits   - Assess range of motion  - Assess patient's mobility; develop plan if impaired  - Assess patient's need for assistive devices and provide as appropriate  - Encourage maximum independence but intervene and supervise when necessary  - Involve family in performance of ADLs  - Assess for home care needs following discharge   - Consider OT consult to assist with ADL evaluation and planning for discharge  - Provide patient education as appropriate  10/14/2022 1827 by Georgetta Mortimer, RN  Outcome: Adequate for Discharge  10/14/2022 0920 by Georgetta Mortimer, RN  Outcome: Progressing  Goal: Maintains/Returns to pre admission functional level  Description: INTERVENTIONS:  - Perform BMAT or MOVE assessment daily    - Set and communicate daily mobility goal to care team and patient/family/caregiver     - Collaborate with rehabilitation services on mobility goals if consulted    - Out of bed for toileting  - Record patient progress and toleration of activity level   10/14/2022 1827 by Georgetta Mortimer, RN  Outcome: Adequate for Discharge  10/14/2022 0920 by Georgetta Mortimer, RN  Outcome: Progressing     Problem: DISCHARGE PLANNING  Goal: Discharge to home or other facility with appropriate resources  Description: INTERVENTIONS:  - Identify barriers to discharge w/patient and caregiver  - Arrange for needed discharge resources and transportation as appropriate  - Identify discharge learning needs (meds, wound care, etc )  - Arrange for interpretive services to assist at discharge as needed  - Refer to Case Management Department for coordinating discharge planning if the patient needs post-hospital services based on physician/advanced practitioner order or complex needs related to functional status, cognitive ability, or social support system  10/14/2022 1827 by Dru Oseguera RN  Outcome: Adequate for Discharge  10/14/2022 0920 by Dru Oseguera RN  Outcome: Progressing     Problem: Knowledge Deficit  Goal: Patient/family/caregiver demonstrates understanding of disease process, treatment plan, medications, and discharge instructions  Description: Complete learning assessment and assess knowledge base    Interventions:  - Provide teaching at level of understanding  - Provide teaching via preferred learning methods  10/14/2022 1827 by Dru Oseguera RN  Outcome: Adequate for Discharge  10/14/2022 0920 by Dru Oseguera RN  Outcome: Progressing

## 2022-10-14 NOTE — ASSESSMENT & PLAN NOTE
-Patient is high risk of delirium due to cognitive impairment, vision loss, change in environment, anemia, pain, insomnia  -Initiate delirium precautions  -maintain normal sleep/wake cycle, add melatonin 3 mg q h s , decrease Seroquel to 25 mg q h s  and taper off as tolerated  -minimize overnight interruptions, group overnight vitals/labs/nursing checks as possible  -dim lights, close blinds and turn off tv to minimize stimulation and encourage sleep environment in evenings  -ensure that pain is well controlled, consider Tylenol 975mg Q8H scheduled   -monitor for fecal and urinary retention which may precipitate delirium  -encourage early mobilization and ambulation  -provide frequent reorientation and redirection  -encourage family and friends at the bedside to help calm patient if anxious  -avoid medications which may precipitate or worsen delirium such as tramadol, benzodiazepine, anticholinergics, and antihistaminics  -encourage hydration and nutrition , assist with feeding if needed  -redirect unwanted behaviors as first line, avoid physical restraints  - consider starting low-dose gabapentin if patient noted to be anxious  - if patient becomes severely agitated and not taking p o  medication I would recommend Zyprexa 2 5 mg IM q 12 hours as needed  - patient noted to have tongue protrusion, avoid 1st generation antipsychotics and taper off antipsychotics as tolerated  - monitor EKG periodically for QTC prolongation

## 2022-10-14 NOTE — ASSESSMENT & PLAN NOTE
Patient with reported falls in the past year  Monitor orthostatic vital signs  Consult PT/OT  Placed on fall precautions  Encourage p o  intake  Avoid hypotension

## 2022-10-14 NOTE — PLAN OF CARE
Problem: Potential for Falls  Goal: Patient will remain free of falls  Description: INTERVENTIONS:  - Educate patient/family on patient safety including physical limitations  - Instruct patient to call for assistance with activity   - Consult OT/PT to assist with strengthening/mobility   - Keep Call bell within reach  - Keep bed low and locked with side rails adjusted as appropriate  - Keep care items and personal belongings within reach  - Initiate and maintain comfort rounds  - Make Fall Risk Sign visible to staff  - Offer Toileting every 4Hours, in advance of need  - Initiate/Maintain alarm    - Apply yellow socks and bracelet for high fall risk patients  - Consider moving patient to room near nurses station  Outcome: Progressing     Problem: Prexisting or High Potential for Compromised Skin Integrity  Goal: Skin integrity is maintained or improved  Description: INTERVENTIONS:  - Identify patients at risk for skin breakdown  - Assess and monitor skin integrity  - Assess and monitor nutrition and hydration status  - Monitor labs   - Assess for incontinence   - Turn and reposition patient  - Assist with mobility/ambulation  - Relieve pressure over bony prominences  - Avoid friction and shearing  - Provide appropriate hygiene as needed including keeping skin clean and dry  - Evaluate need for skin moisturizer/barrier cream  - Collaborate with interdisciplinary team   - Patient/family teaching    Outcome: Progressing     Problem: PAIN - ADULT  Goal: Verbalizes/displays adequate comfort level or baseline comfort level  Description: Interventions:  - Encourage patient to monitor pain and request assistance  - Assess pain using appropriate pain scale  - Administer analgesics based on type and severity of pain and evaluate response  - Implement non-pharmacological measures as appropriate and evaluate response  - Consider cultural and social influences on pain and pain management  - Notify physician/advanced practitioner if interventions unsuccessful or patient reports new pain  Outcome: Progressing     Problem: INFECTION - ADULT  Goal: Absence or prevention of progression during hospitalization  Description: INTERVENTIONS:  - Assess and monitor for signs and symptoms of infection  - Monitor lab/diagnostic results  - Monitor all insertion sites, i e  indwelling lines, tubes, and drains    - Administer medications as ordered  - Instruct and encourage patient and family to use good hand hygiene technique  - Identify and instruct in appropriate isolation precautions for identified infection/condition  Outcome: Progressing  Goal: Absence of fever/infection during neutropenic period  Description: INTERVENTIONS:  - Monitor WBC    Outcome: Progressing     Problem: INFECTION - ADULT  Goal: Absence of fever/infection during neutropenic period  Description: INTERVENTIONS:  - Monitor WBC    Outcome: Progressing     Problem: SAFETY ADULT  Goal: Patient will remain free of falls  Description: INTERVENTIONS:  - Educate patient/family on patient safety including physical limitations  - Instruct patient to call for assistance with activity   - Consult OT/PT to assist with strengthening/mobility   - Keep Call bell within reach  - Keep bed low and locked with side rails adjusted as appropriate  - Keep care items and personal belongings within reach  - Initiate and maintain comfort rounds  - Make Fall Risk Sign visible to staff  - Offer Toileting every 4Hours, in advance of need  - Initiate/Maintain alarm    - Apply yellow socks and bracelet for high fall risk patients  - Consider moving patient to room near nurses station  Outcome: Progressing  Goal: Maintain or return to baseline ADL function  Description: INTERVENTIONS:  -  Assess patient's ability to carry out ADLs; assess patient's baseline for ADL function and identify physical deficits which impact ability to perform ADLs (bathing, care of mouth/teeth, toileting, grooming, dressing, etc )  - Assess/evaluate cause of self-care deficits   - Assess range of motion  - Assess patient's mobility; develop plan if impaired  - Assess patient's need for assistive devices and provide as appropriate  - Encourage maximum independence but intervene and supervise when necessary  - Involve family in performance of ADLs  - Assess for home care needs following discharge   - Consider OT consult to assist with ADL evaluation and planning for discharge  - Provide patient education as appropriate  Outcome: Progressing  Goal: Maintains/Returns to pre admission functional level  Description: INTERVENTIONS:  - Perform BMAT or MOVE assessment daily    - Set and communicate daily mobility goal to care team and patient/family/caregiver     - Collaborate with rehabilitation services on mobility goals if consulted    - Out of bed for toileting  - Record patient progress and toleration of activity level   Outcome: Progressing

## 2022-10-14 NOTE — NURSING NOTE
All belonging returned to patient  IV removed  Patient in no distress and has not complaints at this time  Education paperwork provided to patient's daughter with verbal acknowledgement of understanding  Patient escorted to lobby walking with walker with PCA and daughter 
Patient started to be restless, pacing the hallway and doesn't want to go back in the room  Patient's daughter at the bedside  Patient is noted to be confused and demented  Getting out of bed saying that her house's door is open and her  might get made  Initiated 1:1 for safety and applied 4 soft limb restraints since patient is uncooperative and doesn't follow direction  Paged Family medicine resident with orders made and carried out  193: Family medicine residents at the bedside to evaluate and talk to patient  Called primary , granddaughter  Per granddaughter, patient is confused and demented  Dementia became worst when patient's   and even when she was in [place] she is already confused and will just be walking around  Having her daugghter at her side calms the patient  Will continue to monitor patient 
All other review of systems negative, except as noted in HPI

## 2022-10-15 NOTE — PHYSICAL THERAPY NOTE
Physical Therapy Evaluation    Patient's Name: Chani Leader    Admitting Diagnosis  Rectal bleeding [K62 5]  Chest pain [R07 9]  Dementia ([facility] 75 ) [F03 90]  Chest pain, unspecified type [R07 9]    Problem List  Patient Active Problem List   Diagnosis    SIRS (systemic inflammatory response syndrome) (HCC)    Confusion    HTN (hypertension)    Chronic kidney disease    Dementia (HCC)    Vaginal prolapse    Fecal smearing    Anemia    At risk for delirium    Blindness of left eye    Ambulatory dysfunction    Rectal fissure       Past Medical History  Past Medical History:   Diagnosis Date    Dementia ([facility] 75 )     HLD (hyperlipidemia)     HTN (hypertension)     Vaginal prolapse        Past Surgical History  History reviewed  No pertinent surgical history  Recent Imaging  X-ray chest 1 view portable   Final Result by Santa Quintana MD (02/53 2663)      No acute cardiopulmonary disease  Workstation performed: [id number]             Recent Vital Signs  Vitals:    10/13/22 1406 10/13/22 1543 10/13/22 1951 10/14/22 0808   BP: 166/76 117/80 116/71 122/68   BP Location: Right arm Right arm Right arm Right arm   Pulse: (!) 114 (!) 110 83 76   Resp: 20 20 22 20   Temp: (!) 97 2 °F (36 2 °C) (!) 97 °F (36 1 °C) 98 °F (36 7 °C) 97 8 °F (36 6 °C)   TempSrc: Temporal Temporal Temporal Temporal   SpO2: 98% 97% 97% 97%   Weight: 55 kg (121 lb 4 1 oz)      Height: 4' 11" (1 499 m)           10/14/22 1416   PT Last Visit   PT Visit Date 10/14/22   Note Type   Note type Evaluation   Pain Assessment   Pain Assessment Tool 0-10   Pain Score No Pain   Restrictions/Precautions   Other Precautions Cognitive   Home Living   Additional Comments pt with limited ability to provide home settup due to dementia   Prior Function   Level of [place] Independent with functional mobility; Needs assistance with ADLs; Needs assistance with IADLS   Lives With [employer] From [email or web address] [place]   Family/Caregiver Present No   Cognition   Overall Cognitive Status Impaired   Arousal/Participation Alert   Attention Attends with cues to redirect   Memory Decreased short term memory;Decreased recall of recent events;Decreased recall of precautions   Following Commands Follows one step commands with increased time or repetition   RLE Assessment   RLE Assessment   (4/5)   LLE Assessment   LLE Assessment   (4/5)   Coordination   Movements are Fluid and Coordinated 0   Coordination and Movement Description delayed motor planning and mild unsteadiness   Sensation WFL   Light Touch   RLE Light Touch Grossly intact   LLE Light Touch Grossly intact   Transfers   Sit to Stand 5  Supervision   Additional items Increased time required   Stand to Sit 5  Supervision   Additional items Increased time required   Ambulation/Elevation   Gait Assistance 5  Supervision   Assistive Device Rollabout   Balance   Static Sitting Fair +   Dynamic Sitting Fair +   Static Standing Fair   Dynamic Standing Fair   Ambulatory Fair   Endurance Deficit   Endurance Deficit Yes   Endurance Deficit Description reduced from baseline   Activity Tolerance   Activity Tolerance Patient tolerated treatment well   Medical Staff Made Aware spoke to CM   Nurse Made Aware spoke to RN   Assessment   Prognosis Good   Problem List Decreased strength;Decreased endurance; Impaired balance;Decreased mobility; Decreased coordination;Decreased cognition; Impaired judgement;Decreased safety awareness   Barriers to Discharge Inaccessible home environment   Goals   Patient Goals to rest in chair   Recommendation   PT Discharge Recommendation No rehabilitation needs   Equipment Recommended   (continue use of home DME)   AM-PAC Basic Mobility Inpatient   Turning in Bed Without Bedrails 4   Lying on Back to Sitting on Edge of Flat Bed 4   Moving Bed to Chair 3   Standing Up From Chair 3   Walk in Room 3   Climb 3-5 Stairs 3   Basic Mobility Inpatient Raw Score 20   Basic Mobility Standardized Score 43 99   Highest Level Of Mobility   -HLM Goal 6: Walk 10 steps or more   JH-HLM Achieved 7: Walk 25 feet or more   End of Consult   Patient Position at End of Consult Bedside chair;Bed/Chair alarm activated; All needs within reach         ASSESSMENT                                                                                                                     Bobbi Renee is a 80 y o  female admitted to [facility] on 10/13/2022 for Chest pain  Pt  has a past medical history of Dementia ([address] ), HLD (hyperlipidemia), HTN (hypertension), and Vaginal prolapse    PT was consulted and pt was seen on 10/15/2022 for mobility assessment and d/c planning  Pt presents seated in bed side recliner alert and agreeable to therapy  Impairments limiting pt at this time include impaired balance, decreased endurance, decreased coordination, decreased ADLS, decreased IADLS, decreased safety awareness, impaired judgement, decreased cognition, and decreased strength  Pt is currently functioning at a supervision assistance x1 level for bed mobility, supervision assistance x1 level for transfers, supervision assistance x1 level for ambulation with Rolling Walker  The patient's AM-PAC Basic Mobility Inpatient Short Form Raw Score is 20  A Raw score of greater than 16 suggests the patient may benefit from discharge to home  Please also refer to the recommendation of the Physical Therapist for safe discharge planning      Recommendations                                                                                                              At/near baseline mobility status no inpt PT recommended at this time    DME:  rollator    Discharge Disposition:  Home with no needs      Liv Bach PT, DPT

## 2022-10-17 ENCOUNTER — PATIENT OUTREACH (OUTPATIENT)
Dept: FAMILY MEDICINE CLINIC | Facility: CLINIC | Age: 87
End: 2022-10-17

## 2022-10-17 ENCOUNTER — TRANSITIONAL CARE MANAGEMENT (OUTPATIENT)
Dept: FAMILY MEDICINE CLINIC | Facility: CLINIC | Age: 87
End: 2022-10-17

## 2022-10-17 NOTE — PROGRESS NOTES
Outgoing Calls:  10/17/2022    CMOC did call pt's daughter Juanis Rose, to discuss status of letter needed to sign for DPW  Letter is needed to complete EMA reconsideration  [facility] spoke with Juanis Milton and facilitated a three way call to [facility] and were placed on hold for 20 minutes  Juanis Milton asked for [facility] to call back in a few days to attempt again as she was annoyed by the long wait and stated that pt needed her attention  [facility] agreed  Next outreach is scheduled for 10/19/2022

## 2022-10-19 ENCOUNTER — OFFICE VISIT (OUTPATIENT)
Dept: FAMILY MEDICINE CLINIC | Facility: CLINIC | Age: 87
End: 2022-10-19

## 2022-10-19 VITALS
TEMPERATURE: 96.4 F | RESPIRATION RATE: 18 BRPM | DIASTOLIC BLOOD PRESSURE: 92 MMHG | SYSTOLIC BLOOD PRESSURE: 138 MMHG | OXYGEN SATURATION: 99 % | HEART RATE: 97 BPM

## 2022-10-19 DIAGNOSIS — M25.562 CHRONIC PAIN OF LEFT KNEE: ICD-10-CM

## 2022-10-19 DIAGNOSIS — F03.90 DEMENTIA (HCC): ICD-10-CM

## 2022-10-19 DIAGNOSIS — G89.29 CHRONIC PAIN OF LEFT KNEE: ICD-10-CM

## 2022-10-19 DIAGNOSIS — R26.2 AMBULATORY DYSFUNCTION: ICD-10-CM

## 2022-10-19 DIAGNOSIS — Z09 HOSPITAL DISCHARGE FOLLOW-UP: Primary | ICD-10-CM

## 2022-10-19 DIAGNOSIS — I10 PRIMARY HYPERTENSION: ICD-10-CM

## 2022-10-19 DIAGNOSIS — N81.10 VAGINAL PROLAPSE: ICD-10-CM

## 2022-10-19 PROCEDURE — 99496 TRANSJ CARE MGMT HIGH F2F 7D: CPT | Performed by: FAMILY MEDICINE

## 2022-10-19 RX ORDER — ROSUVASTATIN CALCIUM 10 MG/1
10 TABLET, COATED ORAL DAILY
Qty: 90 TABLET | Refills: 1 | Status: SHIPPED | OUTPATIENT
Start: 2022-10-19

## 2022-10-19 RX ORDER — SENNOSIDES 8.6 MG
650 CAPSULE ORAL EVERY 8 HOURS PRN
Qty: 90 TABLET | Refills: 1 | Status: SHIPPED | OUTPATIENT
Start: 2022-10-19

## 2022-10-19 RX ORDER — GABAPENTIN 100 MG/1
100 CAPSULE ORAL
Qty: 30 CAPSULE | Refills: 1 | Status: SHIPPED | OUTPATIENT
Start: 2022-10-19

## 2022-10-19 RX ORDER — LANOLIN ALCOHOL/MO/W.PET/CERES
3 CREAM (GRAM) TOPICAL
Qty: 90 TABLET | Refills: 1 | Status: SHIPPED | OUTPATIENT
Start: 2022-10-19

## 2022-10-19 RX ORDER — QUETIAPINE FUMARATE 25 MG/1
25 TABLET, FILM COATED ORAL
Qty: 90 TABLET | Refills: 1 | Status: SHIPPED | OUTPATIENT
Start: 2022-10-19

## 2022-10-19 RX ORDER — LOSARTAN POTASSIUM 100 MG/1
100 TABLET ORAL DAILY
Qty: 90 TABLET | Refills: 1 | Status: SHIPPED | OUTPATIENT
Start: 2022-10-19

## 2022-10-19 RX ORDER — AMLODIPINE BESYLATE 10 MG/1
10 TABLET ORAL DAILY
Qty: 90 TABLET | Refills: 1 | Status: SHIPPED | OUTPATIENT
Start: 2022-10-19

## 2022-10-19 NOTE — LETTER
October 19, 2022     Patient: Noel Keita  YOB: 1934  Date of Visit: 10/19/2022      To Whom it May Concern:    Noel Keita is under my professional care  Holland Woodson was seen in my office on 10/19/2022  Holland Woodson has dementia and arthritis as such she is not able to take care of her self  She need help around the clock assistance  If you have any questions or concerns, please don't hesitate to call           Sincerely,          Yudy Price MD

## 2022-10-19 NOTE — PATIENT INSTRUCTIONS
Dolor de rodilla   CUIDADO AMBULATORIO:   Dolor de rodilla puede empezar de forma repentina, o ser un problema a Sally Quitter  Puede sentir dolor en la parte lateral, delantera o trasera de la rodilla  Puede tener la rodilla rígida e hinchada  Puede oír sonidos de chasquido o sentir que la rodilla cede o se le bloquea al andar  Puede sentir dolor cuando se sienta, se pone de pie, camina o sube y baja escaleras  El dolor de rodilla puede estar provocado por condiciones kaiser obesidad, inflamación, esguinces o desgarros de los ligamentos o los tendones  Busque atención médica de inmediato si:  El dolor KIKO, [address]  No puede flexionar o enderezar la pierna completamente  La hinchazón alrededor de la rodilla no disminuye a pesar del tratamiento  La rodilla le duele y se nota caliente al tacto  Comuníquese con bender médico si:  Usted tiene preguntas o inquietudes acerca de bender condición o cuidado  El tratamiento dependerá de la causa del dolor: Es posible que usted necesite alguno de los siguientes:  Los [place], pueden disminuir la inflamación y el dolor o la fiebre  Triny medicamento está disponible con o sin earnestine receta médica  Los SAWYER pueden causar sangrado estomacal o problemas renales en ciertas personas  Si usted delmar un medicamento anticoagulante, siempre pregúntele a bender médico si los SAWYER son seguros para usted  Siempre kim la etiqueta de triny medicamento y [place] instrucciones  Acetaminofén [employer] dolor y baja la fiebre  Está disponible sin receta médica  Pregunte la cantidad y la frecuencia con que debe tomarlos  [id number]  Kim las etiquetas de todos los demás medicamentos que esté usando para saber si también contienen acetaminofén, o pregunte a bender médico o farmacéutico  El acetaminofén puede causar daño en el hígado cuando no se delmar de forma correcta  No use más de 4 gramos (4000 miligramos) en total de acetaminofeno en un día      Puede administrarse podrían administrarse  Pregunte al médico cómo debe [place] reno medicamento de forma heath  Algunos medicamentos recetados para el dolor contienen acetaminofén  No tome otros medicamentos que contengan acetaminofén sin consultarlo con bender médico  Demasiado acetaminofeno puede causar daño al hígado  Los medicamentos recetados para el dolor podrían causar estreñimiento  Pregunte a bender médico kaiser prevenir o tratar estreñimiento  Las inyecciones de esteroides podrían administrarse en la rodilla  Los esteroides reducen la inflamación y el dolor  La cirugía puede usarse para algunas lesiones, kaiser para la reparación del desgarro del ligamento frank anterior (LCA)  Lo que puede hacer para [place] síntomas:  Repose la rodilla para que se pueda curar  Limite las actividades que aumenten el dolor  Ty ejercicios de bajo impacto, kaiser caminar o nadar  Aplique hielo para ayudar a disminuir la inflamación y el dolor  Use earnestine compresa de hielo o ponga hielo triturado en earnestine bolsa de plástico  [place]lli [place] con earnestine toalla antes de aplicarla sobre la herida  Aplique hielo lor 15 a 20 minutos por hora o según indicaciones  Aplique compresión para ayudar a reducir la inflamación  Use earnestine férula o venda solamente si sigue las instrucciones del ania  Eleve la rodilla para ayudar a disminuir el dolor y la inflamación  Eleve la rodilla mientras esté sentado o acostado  Apoye la pierna sobre almohadones para mantener la rodilla por encima del corazón  Evite que la rodilla se [place], kaiser se le haya indicado  Bender médico podría ponerle un yeso o férula  Usted podría necesitar de un yeso en bender pierna para estabilizar bender rodilla  El yeso en la pierna puede ajustarse para aumentar bender rango de movimiento a medida que bender [place]  Lo que puede hacer para prevenir el dolor de rodilla: Mantenga un peso saludable  El exceso de peso aumenta bender riesgo de sufrir dolor de [address]   Consulte con bender médico cuánto debería pesar  Él puede ayudarlo a crear un plan de pérdida de peso seguro si usted tiene sobrepeso  Maryla Elissa o entrene correctamente  Utilice los aparatos adecuados para los deportes  Use zapatos que brinden un buen soporte  Verifique brambila postura a menudo mientras hace ejercicio, juega deportes o entrena para un evento  [place] puede ayudar a prevenir el estrés y la tensión en las rodillas  Descanse entre sesiones para no esforzar excesivamente las rodillas  Acuda en 24 horas a earnestine alejandra de seguimiento con brambila médico o kaiser se le indique: Anote albin preguntas para que se acuerde de hacerlas lor albin visitas  © Copyright [address] 2022 Information is for End User's use only and may not be sold, redistributed or otherwise used for commercial purposes  All illustrations and images included in CareNotes® are the copyrighted property of [employer]  or [address] [place] [address] es sólo para uso en educación  Brambila intención no es darle un consejo médico sobre enfermedades o tratamientos  Colsulte con brambila Gaylyn Harsh farmacéutico antes de seguir cualquier régimen médico para saber si es seguro y efectivo para usted

## 2022-10-19 NOTE — PROGRESS NOTES
Assessment & Plan     1  Hospital discharge follow-up    2  Primary hypertension  Assessment & Plan:  Well control on amlodipine 10mg and losartan 100mg, continue    Orders:  -     amLODIPine (NORVASC) 10 mg tablet; Take 1 tablet (10 mg total) by mouth daily  -     losartan (COZAAR) 100 MG tablet; Take 1 tablet (100 mg total) by mouth daily  -     rosuvastatin (CRESTOR) 10 MG tablet; Take 1 tablet (10 mg total) by mouth daily    3  Dementia [facility])  Assessment & Plan:  Need helps with her ADLs and iADLs, cubather is helping however she would like for her mother to have a home health aid  On memantine 10mg  Have trouble with sleeping  Was previously on seroquel 25mg, however she ran out of it and this probably causing her lack of sleep lately  She is also on melatonin 3mg   Continue the above medication    Orders:  -     QUEtiapine (SEROquel) 25 mg tablet; Take 1 tablet (25 mg total) by mouth daily at bedtime  -     melatonin 3 mg; Take 1 tablet (3 mg total) by mouth daily at bedtime  -     gabapentin (NEURONTIN) 100 mg capsule; Take 1 capsule (100 mg total) by mouth daily at bedtime    4  Chronic pain of left knee  Comments:  chronic  likel 2/2 to arthritis  trial of valtaren gel and tylenol PRN  Orders:  -     Diclofenac Sodium (VOLTAREN) 1 %; Apply 2 g topically 4 (four) times a day  -     acetaminophen (TYLENOL) 650 mg CR tablet; Take 1 tablet (650 mg total) by mouth every 8 (eight) hours as needed for mild pain, moderate pain or headaches    5  Vaginal prolapse  Assessment & Plan:  Chronic  Worsening currently per daughter associated with fecal incontinence and urinary incontinence   Was previously referred to urogyn,   daughter is advice to go upstair and make an appointment with urogyn, there number is also provided      6  Ambulatory dysfunction  Assessment & Plan:  Use a walker to ambulate         Subjective     Transitional Care Management Review:   Chani Rock is a 80 y o  female here for TCM follow up  During the TCM phone call patient stated:  TCM Call     Date and time call was made  10/17/2022  2:52 PM    Patient was hospitialized at  [address]    Date of Admission  10/13/22    Date of discharge  10/14/22    Diagnosis  CHEST PAIN    Disposition  Home    Current Symptoms  None      TCM Call     Post hospital issues  None    Scheduled for follow up? Yes    I have advised the patient to call PCP with any new or worsening symptoms  ERINN MARINO      [facility]  used [id number]  HPI   Tia Villarreal is a 80 y o  female who present to the office with her daugther for hospital follow up  She was recently hospitalized for chest pain, ACS rule out  ACS was ruled out  And patient is currently asymptomatic   daugther explained that she has not been sleeping much, it is likely because she ran out of her medication  While in the hospital, blood in the stool was noted, daugther reports that that has resolved, however when she wipe yesterday there was a few streck of blood on the toilet tissue  She hasn't seen urogyn yet, she was previously referred to them due to her vaginal prolapse  Denies other symptoms    Review of Systems   Constitutional: Negative for chills and fever  Respiratory: Negative for chest tightness, shortness of breath and wheezing  Cardiovascular: Negative for chest pain  Genitourinary: Negative for decreased urine volume, urgency and vaginal bleeding (vaginal prolapse)  Musculoskeletal: Positive for arthralgias  Neurological: Negative for dizziness and weakness  Psychiatric/Behavioral: Positive for sleep disturbance  Objective     /92 (BP Location: Right arm, Patient Position: Sitting, Cuff Size: Standard)   Pulse 97   Temp (!) 96 4 °F (35 8 °C) (Temporal)   Resp 18   SpO2 99%      Physical Exam  Cardiovascular:      Rate and Rhythm: Normal rate  Pulmonary:      Effort: Pulmonary effort is normal    Skin:     General: Skin is warm and dry  Neurological:      Mental Status: She is alert         Darin Gillis MD

## 2022-10-19 NOTE — ASSESSMENT & PLAN NOTE
Chronic  Worsening currently per daughter associated with fecal incontinence and urinary incontinence   Was previously referred to urogyn,   daughter is advice to go upstair and make an appointment with urogyn, there number is also provided

## 2022-10-19 NOTE — ASSESSMENT & PLAN NOTE
Need helps with her ADLs and iADLs, cubather is helping however she would like for her mother to have a home health aid  On memantine 10mg  Have trouble with sleeping  Was previously on seroquel 25mg, however she ran out of it and this probably causing her lack of sleep lately  She is also on melatonin 3mg   Continue the above medication

## 2022-10-20 NOTE — ASSESSMENT & PLAN NOTE
Resolved, denies pain with wiping, also sometimes some streak of blood is on the toilet paper  Continue to stay hydrated, increase fiber in diet, milarax PRN for constipation  Can continue with topical steroid

## 2022-10-21 ENCOUNTER — PATIENT OUTREACH (OUTPATIENT)
Dept: FAMILY MEDICINE CLINIC | Facility: CLINIC | Age: 87
End: 2022-10-21

## 2022-10-21 NOTE — PROGRESS NOTES
Outgoing Calls:  10/21/2022    CMOC did call Simin Montoya to discuss status of forms needed to complete EMA application  Pt should have received a form PA 0289 via mail  Simin Montoya states they still have not received this form  [facility] did call with Simin Montoya and requested another form to be sent to pt's home and also to be faxed to [facility]  DPW rep was unable to email said form to [facility] herself  CMOC to f/u  Next outreach is scheduled for 10/28/2022

## 2022-10-28 ENCOUNTER — PATIENT OUTREACH (OUTPATIENT)
Dept: FAMILY MEDICINE CLINIC | Facility: CLINIC | Age: 87
End: 2022-10-28

## 2022-10-28 NOTE — PROGRESS NOTES
Outgoing Call:  10/28/2022    [facility] did call Linda Early to check on status of Pa 1719 form which she should have received from [facility] over a week ago  She stated that she still has not received this form  It is needed to complete process of applying for MA  [facility] agreed to call with her on Monday morning to DPW if she does not receive document over the weekend  Next outreach is scheduled for 10/31/2022

## 2022-10-31 ENCOUNTER — HOSPITAL ENCOUNTER (INPATIENT)
Facility: HOSPITAL | Age: 87
LOS: 2 days | Discharge: HOME | End: 2022-11-02
Attending: EMERGENCY MEDICINE | Admitting: FAMILY MEDICINE

## 2022-10-31 ENCOUNTER — PATIENT OUTREACH (OUTPATIENT)
Dept: FAMILY MEDICINE CLINIC | Facility: CLINIC | Age: 87
End: 2022-10-31

## 2022-10-31 ENCOUNTER — APPOINTMENT (EMERGENCY)
Dept: RADIOLOGY | Facility: HOSPITAL | Age: 87
End: 2022-10-31

## 2022-10-31 ENCOUNTER — APPOINTMENT (EMERGENCY)
Dept: CT IMAGING | Facility: HOSPITAL | Age: 87
End: 2022-10-31

## 2022-10-31 DIAGNOSIS — R77.8 ELEVATED TROPONIN: ICD-10-CM

## 2022-10-31 DIAGNOSIS — G89.29 CHRONIC PAIN OF LEFT KNEE: ICD-10-CM

## 2022-10-31 DIAGNOSIS — M25.562 CHRONIC PAIN OF LEFT KNEE: ICD-10-CM

## 2022-10-31 DIAGNOSIS — R06.82 TACHYPNEA: ICD-10-CM

## 2022-10-31 DIAGNOSIS — D72.829 ELEVATED WHITE BLOOD CELL COUNT: ICD-10-CM

## 2022-10-31 DIAGNOSIS — R00.0 TACHYCARDIA: ICD-10-CM

## 2022-10-31 DIAGNOSIS — E86.0 DEHYDRATION: Primary | ICD-10-CM

## 2022-10-31 DIAGNOSIS — F03.90 DEMENTIA (HCC): ICD-10-CM

## 2022-10-31 DIAGNOSIS — R26.2 AMBULATORY DYSFUNCTION: ICD-10-CM

## 2022-10-31 DIAGNOSIS — N39.0 URINARY TRACT INFECTION WITHOUT HEMATURIA, SITE UNSPECIFIED: ICD-10-CM

## 2022-10-31 LAB
2HR DELTA HS TROPONIN: -5 NG/L
4HR DELTA HS TROPONIN: -17 NG/L
ALBUMIN SERPL BCP-MCNC: 4.4 G/DL (ref 3.5–5)
ALP SERPL-CCNC: 117 U/L (ref 43–122)
ALT SERPL W P-5'-P-CCNC: 22 U/L
ANION GAP SERPL CALCULATED.3IONS-SCNC: 14 MMOL/L (ref 5–14)
AST SERPL W P-5'-P-CCNC: 26 U/L (ref 14–36)
ATRIAL RATE: 113 BPM
ATRIAL RATE: 116 BPM
BACTERIA UR QL AUTO: ABNORMAL /HPF
BASOPHILS # BLD AUTO: 0.05 THOUSANDS/ÂΜL (ref 0–0.1)
BASOPHILS NFR BLD AUTO: 0 % (ref 0–1)
BILIRUB SERPL-MCNC: 0.5 MG/DL (ref 0.2–1)
BILIRUB UR QL STRIP: NEGATIVE
BUN SERPL-MCNC: 41 MG/DL (ref 5–25)
CALCIUM SERPL-MCNC: 9.7 MG/DL (ref 8.4–10.2)
CARDIAC TROPONIN I PNL SERPL HS: 64 NG/L
CARDIAC TROPONIN I PNL SERPL HS: 76 NG/L
CARDIAC TROPONIN I PNL SERPL HS: 81 NG/L
CHLORIDE SERPL-SCNC: 104 MMOL/L (ref 96–108)
CLARITY UR: ABNORMAL
CO2 SERPL-SCNC: 20 MMOL/L (ref 21–32)
COLOR UR: ABNORMAL
CREAT SERPL-MCNC: 1.95 MG/DL (ref 0.6–1.2)
EOSINOPHIL # BLD AUTO: 0.07 THOUSAND/ÂΜL (ref 0–0.61)
EOSINOPHIL NFR BLD AUTO: 1 % (ref 0–6)
ERYTHROCYTE [DISTWIDTH] IN BLOOD BY AUTOMATED COUNT: 13.2 % (ref 11.6–15.1)
FLUAV RNA RESP QL NAA+PROBE: NEGATIVE
FLUBV RNA RESP QL NAA+PROBE: NEGATIVE
GFR SERPL CREATININE-BSD FRML MDRD: 22 ML/MIN/1.73SQ M
GLUCOSE SERPL-MCNC: 136 MG/DL (ref 70–99)
GLUCOSE UR STRIP-MCNC: NEGATIVE MG/DL
HCT VFR BLD AUTO: 30.3 % (ref 34.8–46.1)
HGB BLD-MCNC: 10.4 G/DL (ref 11.5–15.4)
HGB UR QL STRIP.AUTO: 25
HYALINE CASTS #/AREA URNS LPF: ABNORMAL /LPF
IMM GRANULOCYTES # BLD AUTO: 0.05 THOUSAND/UL (ref 0–0.2)
IMM GRANULOCYTES NFR BLD AUTO: 0 % (ref 0–2)
KETONES UR STRIP-MCNC: NEGATIVE MG/DL
LACTATE SERPL-SCNC: 1.7 MMOL/L (ref 0.5–2)
LEUKOCYTE ESTERASE UR QL STRIP: 500
LIPASE SERPL-CCNC: 275 U/L (ref 23–300)
LYMPHOCYTES # BLD AUTO: 1.99 THOUSANDS/ÂΜL (ref 0.6–4.47)
LYMPHOCYTES NFR BLD AUTO: 16 % (ref 14–44)
MAGNESIUM SERPL-MCNC: 2 MG/DL (ref 1.6–2.3)
MCH RBC QN AUTO: 29.9 PG (ref 26.8–34.3)
MCHC RBC AUTO-ENTMCNC: 34.3 G/DL (ref 31.4–37.4)
MCV RBC AUTO: 87 FL (ref 82–98)
MONOCYTES # BLD AUTO: 1.48 THOUSAND/ÂΜL (ref 0.17–1.22)
MONOCYTES NFR BLD AUTO: 12 % (ref 4–12)
NEUTROPHILS # BLD AUTO: 8.69 THOUSANDS/ÂΜL (ref 1.85–7.62)
NEUTS SEG NFR BLD AUTO: 71 % (ref 43–75)
NITRITE UR QL STRIP: NEGATIVE
NON-SQ EPI CELLS URNS QL MICRO: ABNORMAL /HPF
NRBC BLD AUTO-RTO: 0 /100 WBCS
NT-PROBNP SERPL-MCNC: 2190 PG/ML (ref 0–299)
P AXIS: 69 DEGREES
P AXIS: 71 DEGREES
PH UR STRIP.AUTO: 6 [PH]
PLATELET # BLD AUTO: 419 THOUSANDS/UL (ref 149–390)
PMV BLD AUTO: 9.8 FL (ref 8.9–12.7)
POTASSIUM SERPL-SCNC: 4.9 MMOL/L (ref 3.5–5.3)
PR INTERVAL: 142 MS
PR INTERVAL: 144 MS
PROT SERPL-MCNC: 8.2 G/DL (ref 6.4–8.4)
PROT UR STRIP-MCNC: ABNORMAL MG/DL
QRS AXIS: -13 DEGREES
QRS AXIS: -4 DEGREES
QRSD INTERVAL: 62 MS
QRSD INTERVAL: 62 MS
QT INTERVAL: 320 MS
QT INTERVAL: 342 MS
QTC INTERVAL: 444 MS
QTC INTERVAL: 469 MS
RBC # BLD AUTO: 3.48 MILLION/UL (ref 3.81–5.12)
RBC #/AREA URNS AUTO: ABNORMAL /HPF
RSV RNA RESP QL NAA+PROBE: NEGATIVE
SARS-COV-2 RNA RESP QL NAA+PROBE: NEGATIVE
SODIUM SERPL-SCNC: 138 MMOL/L (ref 135–147)
SP GR UR STRIP.AUTO: 1.01 (ref 1–1.04)
T WAVE AXIS: 24 DEGREES
T WAVE AXIS: 60 DEGREES
UROBILINOGEN UA: NEGATIVE MG/DL
VENTRICULAR RATE: 113 BPM
VENTRICULAR RATE: 116 BPM
WBC # BLD AUTO: 12.33 THOUSAND/UL (ref 4.31–10.16)
WBC #/AREA URNS AUTO: ABNORMAL /HPF

## 2022-10-31 RX ORDER — AMLODIPINE BESYLATE 10 MG/1
10 TABLET ORAL DAILY
Status: DISCONTINUED | OUTPATIENT
Start: 2022-11-01 | End: 2022-11-02 | Stop reason: HOSPADM

## 2022-10-31 RX ORDER — OLANZAPINE 10 MG/1
2.5 INJECTION, POWDER, LYOPHILIZED, FOR SOLUTION INTRAMUSCULAR ONCE AS NEEDED
Status: DISCONTINUED | OUTPATIENT
Start: 2022-10-31 | End: 2022-11-02 | Stop reason: HOSPADM

## 2022-10-31 RX ORDER — LANOLIN ALCOHOL/MO/W.PET/CERES
3 CREAM (GRAM) TOPICAL
Status: DISCONTINUED | OUTPATIENT
Start: 2022-10-31 | End: 2022-11-02 | Stop reason: HOSPADM

## 2022-10-31 RX ORDER — PRAVASTATIN SODIUM 40 MG
40 TABLET ORAL DAILY
Status: DISCONTINUED | OUTPATIENT
Start: 2022-10-31 | End: 2022-11-02 | Stop reason: HOSPADM

## 2022-10-31 RX ORDER — LOSARTAN POTASSIUM 50 MG/1
100 TABLET ORAL DAILY
Status: DISCONTINUED | OUTPATIENT
Start: 2022-11-01 | End: 2022-10-31

## 2022-10-31 RX ORDER — CEFEPIME HYDROCHLORIDE 1 G/50ML
1000 INJECTION, SOLUTION INTRAVENOUS EVERY 24 HOURS
Status: DISCONTINUED | OUTPATIENT
Start: 2022-10-31 | End: 2022-11-02 | Stop reason: HOSPADM

## 2022-10-31 RX ORDER — BISOPROLOL FUMARATE 5 MG/1
2.5 TABLET, FILM COATED ORAL DAILY
Status: DISCONTINUED | OUTPATIENT
Start: 2022-11-01 | End: 2022-11-02 | Stop reason: HOSPADM

## 2022-10-31 RX ORDER — CEFEPIME HYDROCHLORIDE 1 G/50ML
1000 INJECTION, SOLUTION INTRAVENOUS EVERY 24 HOURS
Status: DISCONTINUED | OUTPATIENT
Start: 2022-10-31 | End: 2022-10-31

## 2022-10-31 RX ORDER — HEPARIN SODIUM 5000 [USP'U]/ML
5000 INJECTION, SOLUTION INTRAVENOUS; SUBCUTANEOUS EVERY 8 HOURS SCHEDULED
Status: DISCONTINUED | OUTPATIENT
Start: 2022-10-31 | End: 2022-10-31

## 2022-10-31 RX ORDER — QUETIAPINE FUMARATE 25 MG/1
25 TABLET, FILM COATED ORAL
Status: DISCONTINUED | OUTPATIENT
Start: 2022-10-31 | End: 2022-11-02 | Stop reason: HOSPADM

## 2022-10-31 RX ORDER — CLOPIDOGREL BISULFATE 75 MG/1
75 TABLET ORAL DAILY
Status: DISCONTINUED | OUTPATIENT
Start: 2022-11-01 | End: 2022-11-02 | Stop reason: HOSPADM

## 2022-10-31 RX ORDER — GABAPENTIN 100 MG/1
100 CAPSULE ORAL
Status: DISCONTINUED | OUTPATIENT
Start: 2022-10-31 | End: 2022-11-02 | Stop reason: HOSPADM

## 2022-10-31 RX ORDER — OLANZAPINE 10 MG/1
2.5 INJECTION, POWDER, LYOPHILIZED, FOR SOLUTION INTRAMUSCULAR ONCE
Status: DISCONTINUED | OUTPATIENT
Start: 2022-10-31 | End: 2022-10-31

## 2022-10-31 RX ORDER — ACETAMINOPHEN 325 MG/1
650 TABLET ORAL EVERY 6 HOURS
Status: DISCONTINUED | OUTPATIENT
Start: 2022-10-31 | End: 2022-11-02 | Stop reason: HOSPADM

## 2022-10-31 RX ORDER — SODIUM CHLORIDE 9 MG/ML
125 INJECTION, SOLUTION INTRAVENOUS CONTINUOUS
Status: DISCONTINUED | OUTPATIENT
Start: 2022-10-31 | End: 2022-11-01

## 2022-10-31 RX ORDER — HEPARIN SODIUM 5000 [USP'U]/ML
5000 INJECTION, SOLUTION INTRAVENOUS; SUBCUTANEOUS EVERY 12 HOURS SCHEDULED
Status: DISCONTINUED | OUTPATIENT
Start: 2022-10-31 | End: 2022-11-02 | Stop reason: HOSPADM

## 2022-10-31 RX ORDER — SODIUM CHLORIDE 9 MG/ML
3 INJECTION INTRAVENOUS
Status: DISCONTINUED | OUTPATIENT
Start: 2022-10-31 | End: 2022-11-02 | Stop reason: HOSPADM

## 2022-10-31 RX ADMIN — SODIUM CHLORIDE 1000 ML: 0.9 INJECTION, SOLUTION INTRAVENOUS at 17:44

## 2022-10-31 RX ADMIN — SODIUM CHLORIDE 125 ML/HR: 0.9 INJECTION, SOLUTION INTRAVENOUS at 11:50

## 2022-10-31 RX ADMIN — ACETAMINOPHEN 650 MG: 325 TABLET ORAL at 19:14

## 2022-10-31 RX ADMIN — MELATONIN TAB 3 MG 3 MG: 3 TAB at 21:09

## 2022-10-31 RX ADMIN — CEFEPIME HYDROCHLORIDE 1000 MG: 1 INJECTION, SOLUTION INTRAVENOUS at 19:14

## 2022-10-31 RX ADMIN — DICLOFENAC SODIUM 2 G: 10 GEL TOPICAL at 17:51

## 2022-10-31 RX ADMIN — HEPARIN SODIUM 5000 UNITS: 5000 INJECTION INTRAVENOUS; SUBCUTANEOUS at 21:12

## 2022-10-31 RX ADMIN — PRAVASTATIN SODIUM 40 MG: 40 TABLET ORAL at 17:44

## 2022-10-31 RX ADMIN — GABAPENTIN 100 MG: 100 CAPSULE ORAL at 21:09

## 2022-10-31 RX ADMIN — QUETIAPINE FUMARATE 25 MG: 25 TABLET ORAL at 21:09

## 2022-10-31 RX ADMIN — DICLOFENAC SODIUM 2 G: 10 GEL TOPICAL at 21:17

## 2022-10-31 NOTE — H&P
History and Physical - Jayla Caldwell    Patient Information: Shayy Juan 80 y o  female MRN: [id number]  Unit/Bed#: 7T [facility] 716-01 Encounter: [id number]  Admitting Physician: Audrey Moralez  PCP: Champ Conway MD  Date of Admission:  10/31/22    Assessment and Plan  80 y o  female with PMH of dementia, HTN, CKD, and chronic knee pain presenting with agitation and chest pain since last night found to be tachypneic, tachycardic with an elevated WBC being admitted due to meeting SIRS criteria and for ACS rule out  * SIRS (systemic inflammatory response syndrome) (HCC)  Assessment & Plan  Meets SIRS criteria: tachycardia, tachypnea, WBC 12 33  Currently testing for potential sources of sepsis UTI vs bacteria vs pneumonia  Highest on differential UTI given suprapubic tenderness on exam, urinary frequency over past week + patient's risk factors (dementia, bowel incontinence)  Pneumonia ruled out on chest xray and CT chest     - will straight cath patient to obtain urine sample  - begin empiric antibiotics: renally dosed cefepime at 1g q24hrs   - 1L bolus, continue on mIVF   - Trend CMP  - FU UA/UCx  - FU blood cultures     Other chest pain  Assessment & Plan  Elevated troponins on admission (hs Tnl 0hr - 81), currently downtrending (hs TnI 4hr - 64)  No acute EKG changes  ACS ruled out  Of note: prior UTI episodes have presented with chest pain  Chronic pain of left knee  Assessment & Plan  Non-erythematous, no crepitus, passive range of motion, slightly warmer than right knee  Worsening ambulation over last week, inability to put weight on it       - tylenol 650 mg q8  - ambulatory dysfunction, PT eval     HTN (hypertension)  Assessment & Plan  BP on admission 108/62     - continue home medications: Amlodipine 10 mg tablet, Bisoprolol 5mg  - discontinue Losartan 100 mg given low BPs which could possibly explain tachycardia    Dementia [facility])  Assessment & Plan  Delirium precautions      - continue home medications: gabapentin 100 mg, memantine 10 mg, melatonin 3mg   - restarting her on the Quetiapine 25hs   - maintain adequate PO/hydration  - maintain daily orientation, and sleep/wake cycle  - fall precautions      Chronic kidney disease  Assessment & Plan  GFR 22  Elevated creatinine 1 95 (prior admisison 1 51)  BUN 41  Current dehydration and CKD could explain BNP elevated to 2190     - 1L Bolus of fluid given, trend BMP in morning  - maintain good hydration  Hyperlipidemia  Assessment & Plan    - continue rosuvastatin 10 mg    History of CVA (cerebrovascular accident)  Assessment & Plan  Could be contributory to current dementia (vascular)  Daughter denies any history of DVT  - continue her home clopidogrel 75mg  VTE Prophylaxis: Heparin 5000 sq  Code Status: Level 1 - Full Code  Anticipated Length of Stay:  Patient will be admitted on an Inpatient basis with an anticipated length of stay of  less than 2 midnights  Justification for Hospital Stay: SIRS, ACS rule out  Total Time for Visit, including Counseling / Coordination of Care: 120  mins  Greater than 50% of this total time spent on direct patient counseling and coordination of care  Patient Information Sharing: With the consent of Aaron Britton , their loved ones Wendy Melgoza) were notified today by inpatient team of the patient’s condition and current plan  All questions answered  Chief Complaint:     Chief Complaint   Patient presents with   • Knee Pain     Left - pt complaining of pain since 1 week, got worse today     • Arm Pain     Right- No fall       History of Present Illness:    Aaron Britton is a 80 y o  female with PMH dementia, CKD, Hyperlipidemia, HTN, and chronic knee pain who presents with increasing left knee pain and inability to ambulate over the last week  Due to patient's baseline mental status, history obtained from daughter   Patient's daughter reports mother has been more agitated over the last week, with increasing reports SOB and chest pain as of last night  She also reports increased frequency of urination this past week (3 times per night), as well as suprapubic tenderness this morning  Daughter denies chills, but reports that she felt warmer yesterday but did not take her temperature  On admission found to be tachycardic (HR:120) and tachypneic (RR:24)  Was recently admitted due to similar complaints of chest pain, at the time normal EKG and troponin  Daughter reports they never followed up with cardiology once discharged  Review of Systems:  Review of Systems   Constitutional: Positive for unexpected weight change  HENT: Negative  Eyes: Negative  Respiratory: Positive for shortness of breath  Cardiovascular: Positive for chest pain  Gastrointestinal: Negative  Endocrine: Negative  Genitourinary: Positive for frequency  Musculoskeletal: Negative  Skin: Negative  Allergic/Immunologic: Negative  Neurological: Positive for facial asymmetry  Hematological: Negative  Psychiatric/Behavioral: Positive for agitation and sleep disturbance  Past Medical and Surgical History:   Past Medical History:   Diagnosis Date   • Dementia ([facility] [address] )    • HLD (hyperlipidemia)    • HTN (hypertension)    • Vaginal prolapse      No past surgical history on file  Meds/Allergies: Allergies: No Known Allergies  Prior to Admission Medications   Prescriptions Last Dose Informant Patient Reported? Taking?    Diclofenac Sodium (VOLTAREN) 1 % 10/31/2022 at Unknown time  No Yes   Sig: Apply 2 g topically 4 (four) times a day   QUEtiapine (SEROquel) 25 mg tablet Past Month at Unknown time  No Yes   Sig: Take 1 tablet (25 mg total) by mouth daily at bedtime   acetaminophen (TYLENOL) 650 mg CR tablet Past Month at Unknown time  No Yes   Sig: Take 1 tablet (650 mg total) by mouth every 8 (eight) hours as needed for mild pain, moderate pain or headaches   amLODIPine (NORVASC) 10 mg tablet 10/31/2022 at Unknown time  No Yes   Sig: Take 1 tablet (10 mg total) by mouth daily   bisoprolol (ZEBETA) 5 mg tablet 10/31/2022 at Unknown time  No Yes   Sig: Take 0 5 tablets (2 5 mg total) by mouth daily   clopidogrel (PLAVIX) 75 mg tablet 10/31/2022 at Unknown time  Yes Yes   Sig: Take 75 mg by mouth daily   gabapentin (NEURONTIN) 100 mg capsule 10/30/2022 at Unknown time  No Yes   Sig: Take 1 capsule (100 mg total) by mouth daily at bedtime   hydrocortisone (ANUSOL-HC) 2 5 % rectal cream Past Week at Unknown time  No Yes   Sig: Apply topically 2 (two) times a day   losartan (COZAAR) 100 MG tablet 10/31/2022 at Unknown time  No Yes   Sig: Take 1 tablet (100 mg total) by mouth daily   melatonin 3 mg 10/30/2022 at Unknown time  No Yes   Sig: Take 1 tablet (3 mg total) by mouth daily at bedtime   memantine (NAMENDA) 10 mg tablet More than a month at Unknown time  Yes No   Sig: Take 20 mg by mouth daily   rosuvastatin (CRESTOR) 10 MG tablet 10/30/2022 at Unknown time  No Yes   Sig: Take 1 tablet (10 mg total) by mouth daily      Facility-Administered Medications: None     Social History:     Social History     Socioeconomic History   • Marital status:       Spouse name: Not on file   • Number of children: Not on file   • Years of education: Not on file   • Highest education level: Not on file   Occupational History   • Not on file   Tobacco Use   • Smoking status: Never Smoker   • Smokeless tobacco: Never Used   Vaping Use   • Vaping Use: Never used   Substance and Sexual Activity   • Alcohol use: Never   • Drug use: Never   • Sexual activity: Not Currently     Partners: Male   Other Topics Concern   • Not on file   Social History Narrative   • Not on file     Social Determinants of Health     Financial Resource Strain: High Risk   • Difficulty of Paying Living Expenses: Hard   Food Insecurity: Food Insecurity Present   • Worried About Running Out of Food in the Last Year: Often true • Ran Out of Food in the Last Year: Often true   Transportation Needs: No Transportation Needs   • Lack of Transportation (Medical): No   • Lack of Transportation (Non-Medical): No   Physical Activity: Not on file   Stress: Not on file   Social Connections: Not on file   Intimate Partner Violence: Not on file   Housing Stability: Not on file     Patient Pre-hospital Living Situation: with daughter and her   Patient Pre-hospital Level of Mobility: only assisted by daughter  Patient Pre-hospital Diet Restrictions: none  Family History:  No family history on file  Physical Exam:   Vitals:   Blood Pressure: 153/82 (10/31/22 1633)  Pulse: (!) 124 (10/31/22 1633)  Temperature: 99 2 °F (37 3 °C) (10/31/22 1633)  Temp Source: Temporal (10/31/22 1633)  Respirations: (!) 24 (10/31/22 1633)  Height: 5' 1" (154 9 cm) (10/31/22 1633)  Weight - Scale: 51 4 kg (113 lb 5 1 oz) (10/31/22 1710)  SpO2: 98 % (10/31/22 1633)    Physical Exam  HENT:      Head: Normocephalic and atraumatic  Nose: Nose normal    Eyes:      Conjunctiva/sclera: Conjunctivae normal    Cardiovascular:      Rate and Rhythm: Regular rhythm  Tachycardia present  Heart sounds: Normal heart sounds  Pulmonary:      Breath sounds: Rhonchi present  Abdominal:      General: Abdomen is flat  Palpations: Abdomen is soft  Tenderness: There is abdominal tenderness  Genitourinary:     Vagina: Vaginal discharge present  Neurological:      Mental Status: Mental status is at baseline  Lab Results: I have personally reviewed pertinent reports      Results from last 7 days   Lab Units 10/31/22  1110   WBC Thousand/uL 12 33*   HEMOGLOBIN g/dL 10 4*   HEMATOCRIT % 30 3*   PLATELETS Thousands/uL 419*   NEUTROS PCT % 71   LYMPHS PCT % 16   MONOS PCT % 12   EOS PCT % 1     Results from last 7 days   Lab Units 10/31/22  1110   POTASSIUM mmol/L 4 9   CHLORIDE mmol/L 104   CO2 mmol/L 20*   BUN mg/dL 41*   CREATININE mg/dL 1 95* CALCIUM mg/dL 9 7   ALK PHOS U/L 117   ALT U/L 22   AST U/L 26   EGFR ml/min/1 73sq m 22   MAGNESIUM mg/dL 2 0             Results from last 7 days   Lab Units 10/31/22  1113   LACTIC ACID mmol/L 1 7         Results from last 7 days   Lab Units 10/31/22  1110   NT-PRO BNP pg/mL 2,190*      Results from last 7 days   Lab Units 10/31/22  1847   COLOR UA  Straw   CLARITY UA  Cloudy*   SPEC GRAV UA  1 010   PH UA  6 0   LEUKOCYTES UA  500 0*   NITRITE UA  Negative   GLUCOSE UA mg/dl Negative   KETONES UA mg/dl Negative   BILIRUBIN UA  Negative   BLOOD UA  25 0*      Results from last 7 days   Lab Units 10/31/22  1847   RBC UA /hpf 1-2   WBC UA /hpf Innumerable*   EPITHELIAL CELLS WET PREP /hpf Occasional   BACTERIA UA /hpf Moderate*        Imaging: I have personally reviewed pertinent reports  CT chest abdomen pelvis wo contrast    Result Date: 10/31/2022  Narrative: CT CHEST, ABDOMEN AND PELVIS WITHOUT IV CONTRAST INDICATION:   elevated dwbc, chest pain   COMPARISON:  7/4/2022 TECHNIQUE: CT examination of the chest, abdomen and pelvis was performed without intravenous contrast  Axial, sagittal, and coronal 2D reformatted images were created from the source data and submitted for interpretation  Radiation dose length product (DLP) for this visit:  408 mGy-cm   This examination, like all CT scans performed in the [place], was performed utilizing techniques to minimize radiation dose exposure, including the use of iterative reconstruction and automated exposure control  Enteric contrast was administered  FINDINGS: CHEST LUNGS:  Biapical pleural-parenchymal scarring  No acute infiltrate  PLEURA:  Unremarkable  HEART/GREAT VESSELS: No pericardial effusion    Coronary artery calcification  MEDIASTINUM AND LEXY:  Small hiatal hernia  CHEST WALL AND LOWER NECK:  Unremarkable   ABDOMEN LIVER/BILIARY TREE:  Evaluation of upper abdomen somewhat limited without IV contrast, as well as the presence of motion artifact  No obvious hepatic pathology GALLBLADDER:  No calcified gallstones  No pericholecystic inflammatory change  Common bile duct diameter of about 9 mm unchanged from before SPLEEN:  Unremarkable  PANCREAS:  No definite abnormality  Prior study demonstrated nonspecific dilatation of the common bile duct and pancreatic duct  Reassessment of pancreatic duct size is limited due to lack of IV contrast and motion  ADRENAL GLANDS:  Unremarkable  KIDNEYS/URETERS:  Incompletely evaluated bilateral renal cysts  No hydronephrosis  No perinephric collection  STOMACH AND BOWEL:  Sigmoid diverticulosis  APPENDIX:  No findings to suggest appendicitis  ABDOMINOPELVIC CAVITY:  No ascites  No pneumoperitoneum  No lymphadenopathy  VESSELS:  Unremarkable for patient's age  PELVIS REPRODUCTIVE ORGANS:  Uterus not seen URINARY BLADDER:  Multiple small bladder diverticula ABDOMINAL WALL/INGUINAL REGIONS:  Diastasis of the rectus muscles  No inguinal adenopathy  OSSEOUS STRUCTURES:  Severe compression deformity of T11 unchanged from prior study  No new compression fractures     Impression: 1  No acute intrathoracic abnormality 2  No acute inflammatory changes in the abdomen or pelvis 3  Multiple small bladder diverticula 4  Unchanged severe compression deformity of T11 5   sigmoid diverticulosis Workstation performed: [id number]     X-ray chest 1 view portable    Result Date: 10/31/2022  Narrative: CHEST INDICATION:   chest pain  Shortness of breath for one week  COMPARISON:  CXR 10/13/2022 and chest CT 7/4/2022  EXAM PERFORMED/VIEWS:  XR CHEST PORTABLE FINDINGS: Cardiomediastinal silhouette appears unremarkable  The lungs are clear  No pneumothorax or pleural effusion  Osseous structures appear within normal limits for patient age  Impression: No acute cardiopulmonary disease   Workstation performed: [id number]     X-ray chest 1 view portable    Result Date: 10/13/2022  Narrative: CHEST INDICATION:   chest pain  COMPARISON:  CXR and CT 7/4/2022  EXAM PERFORMED/VIEWS:  XR CHEST PORTABLE FINDINGS: Cardiomediastinal silhouette appears unremarkable  The lungs are clear  No pneumothorax or pleural effusion  Osseous structures appear within normal limits for patient age  Impression: No acute cardiopulmonary disease  Workstation performed: [id number]       EKG, Pathology, and Other Studies Reviewed on Admission:   EKG  Result Date: 10/31/22  Impression:    Sinus tachycardia  Possible Left atrial enlargement  Borderline ECG  When compared with ECG of 31-OCT-2022 11:01,  No significant change was found      Entire H&P was discussed with Dr Maritza Baugh who agreed to what is noted above    Joseph Duval  10/31/22  8:34 PM

## 2022-10-31 NOTE — ASSESSMENT & PLAN NOTE
With ambulatory dysfunction  Improved with topical Voltaren gel 1%  Plan:  - Family advised to continue assist on ADL    - Discharge home with topical Voltaren 1% and Tylenol 650 mg TID PRN

## 2022-10-31 NOTE — NURSING NOTE
Due to recent admission and necessity for restraints at that time, Daughter Lulu Place verbally authorized use of restraints if necessary for this admission  For patient safety  Will continue to monitor

## 2022-10-31 NOTE — ED PROVIDER NOTES
History  Chief Complaint   Patient presents with   • Knee Pain     Left - pt complaining of pain since 1 week, got worse today     • Arm Pain     Right- No fall       Patient is an 80-year-old female coming in for complaint of left knee pain  Patient has a history of chronic knee pain, and dementia  Patient was recently discharged from the hospital 2 weeks ago  Patient is also complaining of chest pain this time, is to at this time, and is tachycardic and tachypneic  Patient has a history of chronic chest pain  No inciting injury for pain      History provided by:  Patient   used: No    Knee Pain  Location:  Knee  Injury: no    Knee location:  L knee  Associated symptoms: no decreased ROM, no fever, no neck pain, no numbness, no swelling and no tingling    Arm Pain  Associated symptoms: chest pain    Associated symptoms: no abdominal pain, no fever, no nausea, no shortness of breath and no vomiting        Prior to Admission Medications   Prescriptions Last Dose Informant Patient Reported? Taking?    Diclofenac Sodium (VOLTAREN) 1 %   No No   Sig: Apply 2 g topically 4 (four) times a day   QUEtiapine (SEROquel) 25 mg tablet   No No   Sig: Take 1 tablet (25 mg total) by mouth daily at bedtime   acetaminophen (TYLENOL) 650 mg CR tablet   No No   Sig: Take 1 tablet (650 mg total) by mouth every 8 (eight) hours as needed for mild pain, moderate pain or headaches   amLODIPine (NORVASC) 10 mg tablet   No No   Sig: Take 1 tablet (10 mg total) by mouth daily   bisoprolol (ZEBETA) 5 mg tablet   No No   Sig: Take 0 5 tablets (2 5 mg total) by mouth daily   clopidogrel (PLAVIX) 75 mg tablet   Yes No   Sig: Take 75 mg by mouth daily   gabapentin (NEURONTIN) 100 mg capsule   No No   Sig: Take 1 capsule (100 mg total) by mouth daily at bedtime   hydrocortisone (ANUSOL-HC) 2 5 % rectal cream   No No   Sig: Apply topically 2 (two) times a day   losartan (COZAAR) 100 MG tablet   No No   Sig: Take 1 tablet (100 mg total) by mouth daily   melatonin 3 mg   No No   Sig: Take 1 tablet (3 mg total) by mouth daily at bedtime   memantine (NAMENDA) 10 mg tablet   Yes No   Sig: Take 20 mg by mouth daily   rosuvastatin (CRESTOR) 10 MG tablet   No No   Sig: Take 1 tablet (10 mg total) by mouth daily      Facility-Administered Medications: None       Past Medical History:   Diagnosis Date   • Dementia ([address] )    • HLD (hyperlipidemia)    • HTN (hypertension)    • Vaginal prolapse        No past surgical history on file  No family history on file  I have reviewed and agree with the history as documented  E-Cigarette/Vaping     E-Cigarette/Vaping Substances     Social History     Tobacco Use   • Smoking status: Never Smoker   • Smokeless tobacco: Never Used   Substance Use Topics   • Alcohol use: Never   • Drug use: Never       Review of Systems   Constitutional: Negative  Negative for activity change, appetite change and fever  HENT: Negative  Eyes: Negative  Respiratory: Negative  Negative for chest tightness and shortness of breath  Cardiovascular: Positive for chest pain  Negative for palpitations  Gastrointestinal: Negative  Negative for abdominal pain, nausea and vomiting  Genitourinary: Negative  Musculoskeletal: Negative  Negative for neck pain  Skin: Negative  Neurological: Negative  Psychiatric/Behavioral: Negative  Physical Exam  Physical Exam  Vitals reviewed  Constitutional:       Appearance: Normal appearance  She is normal weight  HENT:      Head: Normocephalic and atraumatic  Right Ear: External ear normal       Left Ear: External ear normal       Nose: Nose normal    Eyes:      Conjunctiva/sclera: Conjunctivae normal    Cardiovascular:      Rate and Rhythm: Tachycardia present  Pulmonary:      Effort: Pulmonary effort is normal    Abdominal:      Palpations: Abdomen is soft  Tenderness: There is no abdominal tenderness  There is no guarding     Musculoskeletal: General: No swelling, tenderness or deformity  Normal range of motion  Cervical back: Normal range of motion  Right lower leg: No edema  Left lower leg: No edema  Comments: No swelling, erythema, warmth the left knee   Skin:     General: Skin is warm and dry  Neurological:      Mental Status: She is alert  Vital Signs  ED Triage Vitals [10/31/22 1039]   Temperature Pulse Respirations Blood Pressure SpO2   98 °F (36 7 °C) (!) 120 (!) 24 108/62 99 %      Temp Source Heart Rate Source Patient Position - Orthostatic VS BP Location FiO2 (%)   Oral Monitor Sitting Left arm --      Pain Score       --           Vitals:    10/31/22 1150 10/31/22 1300 10/31/22 1322 10/31/22 1330   BP: 117/60 132/68 126/68 114/84   Pulse: 98 (!) 113 (!) 111 (!) 112   Patient Position - Orthostatic VS: Lying Lying Lying Lying         Visual Acuity      ED Medications  Medications   sodium chloride (PF) 0 9 % injection 3 mL (has no administration in time range)   sodium chloride 0 9 % infusion (125 mL/hr Intravenous New Bag 10/31/22 1150)       Diagnostic Studies  Results Reviewed     Procedure Component Value Units Date/Time    Blood culture #1 [[id number]] Collected: 10/31/22 1110    Lab Status: Preliminary result Specimen: Blood from Arm, Right Updated: 10/31/22 1503     Blood Culture Received in Microbiology Lab  Culture in Progress  Blood culture #2 [[id number]] Collected: 10/31/22 1113    Lab Status: Preliminary result Specimen: Blood from Arm, Left Updated: 10/31/22 1503     Blood Culture Received in Microbiology Lab  Culture in Progress      UA w Reflex to Microscopic w Reflex to Culture [[id number]]     Lab Status: No result Specimen: Urine     HS Troponin I 2hr [[id number]]  (Abnormal) Collected: 10/31/22 1317    Lab Status: Final result Specimen: Blood from Arm, Left Updated: 10/31/22 1346     hs TnI 2hr 76 ng/L      Delta 2hr hsTnI -5 ng/L     FLU/RSV/COVID - if FLU/RSV clinically relevant [[id number]]  (Normal) Collected: 10/31/22 1110    Lab Status: Final result Specimen: Nares from Nose Updated: 10/31/22 1208     SARS-CoV-2 Negative     INFLUENZA A PCR Negative     INFLUENZA B PCR Negative     RSV PCR Negative    Narrative:      FOR PEDIATRIC PATIENTS - copy/paste COVID Guidelines URL to browser: [email or web address]    SARS-CoV-2 assay is a Nucleic Acid Amplification assay intended for the  qualitative detection of nucleic acid from SARS-CoV-2 in nasopharyngeal  swabs  Results are for the presumptive identification of SARS-CoV-2 RNA  Positive results are indicative of infection with SARS-CoV-2, the virus  causing COVID-19, but do not rule out bacterial infection or co-infection  with other viruses  Laboratories within the [place] and its  territories are required to report all positive results to the appropriate  public health authorities  Negative results do not preclude SARS-CoV-2  infection and should not be used as the sole basis for treatment or other  patient management decisions  Negative results must be combined with  clinical observations, patient history, and epidemiological information  This test has not been FDA cleared or approved  This test has been authorized by FDA under an Emergency Use Authorization  (EUA)  This test is only authorized for the duration of time the  declaration that circumstances exist justifying the authorization of the  emergency use of an in vitro diagnostic tests for detection of SARS-CoV-2  virus and/or diagnosis of COVID-19 infection under section 564(b)(1) of  the Act, 21 U  S C  673PTI-9(S)(2), unless the authorization is terminated  or revoked sooner  The test has been validated but independent review by FDA  and CLIA is pending  Test performed using [employer] Gene[employer]pert: This RT-PCR assay targets N2,  a region unique to SARS-CoV-2   A conserved region in the E-gene was chosen  for pan-Sarbecovirus detection which includes SARS-CoV-2  According to CMS-2020-01-R, this platform meets the definition of high-throughput technology      HS Troponin I 4hr [[id number]]     Lab Status: No result Specimen: Blood     NT-BNP PRO [[id number]]  (Abnormal) Collected: 10/31/22 1110    Lab Status: Final result Specimen: Blood from Arm, Right Updated: 10/31/22 1148     NT-proBNP 2,190 pg/mL     HS Troponin 0hr (reflex protocol) [[id number]]  (Abnormal) Collected: 10/31/22 1110    Lab Status: Final result Specimen: Blood from Arm, Right Updated: 10/31/22 1146     hs TnI 0hr 81 ng/L     Comprehensive metabolic panel [[id number]]  (Abnormal) Collected: 10/31/22 1110    Lab Status: Final result Specimen: Blood from Arm, Right Updated: 10/31/22 1140     Sodium 138 mmol/L      Potassium 4 9 mmol/L      Chloride 104 mmol/L      CO2 20 mmol/L      ANION GAP 14 mmol/L      BUN 41 mg/dL      Creatinine 1 95 mg/dL      Glucose 136 mg/dL      Calcium 9 7 mg/dL      AST 26 U/L      ALT 22 U/L      Alkaline Phosphatase 117 U/L      Total Protein 8 2 g/dL      Albumin 4 4 g/dL      Total Bilirubin 0 50 mg/dL      eGFR 22 ml/min/1 73sq m     Narrative:      [place] guidelines for Chronic Kidney Disease (CKD):   •  Stage 1 with normal or high GFR (GFR > 90 mL/min/1 73 square meters)  •  Stage 2 Mild CKD (GFR = 60-89 mL/min/1 73 square meters)  •  Stage 3A Moderate CKD (GFR = 45-59 mL/min/1 73 square meters)  •  Stage 3B Moderate CKD (GFR = 30-44 mL/min/1 73 square meters)  •  Stage 4 Severe CKD (GFR = 15-29 mL/min/1 73 square meters)  •  Stage 5 End Stage CKD (GFR <15 mL/min/1 73 square meters)  Note: GFR calculation is accurate only with a steady state creatinine    Lipase [[id number]]  (Normal) Collected: 10/31/22 1110    Lab Status: Final result Specimen: Blood from Arm, Right Updated: 10/31/22 1140     Lipase 275 u/L     Magnesium [[id number]]  (Normal) Collected: 10/31/22 1110    Lab Status: Final result Specimen: Blood from Arm, Right Updated: 10/31/22 1140     Magnesium 2 0 mg/dL     Lactic acid, plasma [[id number]]  (Normal) Collected: 10/31/22 1113    Lab Status: Final result Specimen: Blood from Arm, Left Updated: 10/31/22 1140     LACTIC ACID 1 7 mmol/L     Narrative:      Result may be elevated if tourniquet was used during collection  CBC and differential [[id number]]  (Abnormal) Collected: 10/31/22 1110    Lab Status: Final result Specimen: Blood from Arm, Right Updated: 10/31/22 1124     WBC 12 33 Thousand/uL      RBC 3 48 Million/uL      Hemoglobin 10 4 g/dL      Hematocrit 30 3 %      MCV 87 fL      MCH 29 9 pg      MCHC 34 3 g/dL      RDW 13 2 %      MPV 9 8 fL      Platelets 598 Thousands/uL      nRBC 0 /100 WBCs      Neutrophils Relative 71 %      Immat GRANS % 0 %      Lymphocytes Relative 16 %      Monocytes Relative 12 %      Eosinophils Relative 1 %      Basophils Relative 0 %      Neutrophils Absolute 8 69 Thousands/µL      Immature Grans Absolute 0 05 Thousand/uL      Lymphocytes Absolute 1 99 Thousands/µL      Monocytes Absolute 1 48 Thousand/µL      Eosinophils Absolute 0 07 Thousand/µL      Basophils Absolute 0 05 Thousands/µL                  CT chest abdomen pelvis wo contrast   Final Result by Jenifer Scott MD (10/31 1316)      1  No acute intrathoracic abnormality   2  No acute inflammatory changes in the abdomen or pelvis   3  Multiple small bladder diverticula   4  Unchanged severe compression deformity of T11   5   sigmoid diverticulosis                     Workstation performed: [id number]         X-ray chest 1 view portable   Final Result by Mary Ward MD (10/31 1148)      No acute cardiopulmonary disease                    Workstation performed: [id number]                    Procedures  Procedures         ED Course  ED Course as of 10/31/22 1517   Mon Oct 31, 2022   1126 WBC(!): 12 33   1146 Creatinine(!): 1 95  Elevated from baseline     1147 hs TnI 0hr(!): 81 1149 NT-proBNP(!): 2,190   1152 X-ray chest 1 view portable    No acute cardiopulmonary disease  1200 Spoke to Dr Meliza Stevens, cardiology, who thinks unlikely to be cardiac in nature  Recommends 2nd trop   1319 CT chest abdomen pelvis wo contrast  1  No acute intrathoracic abnormality  2  No acute inflammatory changes in the abdomen or pelvis  3  Multiple small bladder diverticula  4  Unchanged severe compression deformity of T11  5   sigmoid diverticulosis   1346 Delta 2hr hsTnI: -5                               SBIRT 20yo+    Flowsheet Row Most Recent Value   SBIRT (25 yo +)    In order to provide better care to our patients, we are screening all of our patients for alcohol and drug use  Would it be okay to ask you these screening questions? Yes Filed at: 10/31/2022 1044   Initial Alcohol Screen: US AUDIT-C     1  How often do you have a drink containing alcohol? 0 Filed at: 10/31/2022 1044   2  How many drinks containing alcohol do you have on a typical day you are drinking? 0 Filed at: 10/31/2022 1044   3b  FEMALE Any Age, or MALE 65+: How often do you have 4 or more drinks on one occassion? 0 Filed at: 10/31/2022 1044   Audit-C Score 0 Filed at: 10/31/2022 1044   FRANK: How many times in the past year have you    Used an illegal drug or used a prescription medication for non-medical reasons? Never Filed at: 10/31/2022 1044                    MDM  Number of Diagnoses or Management Options  Dehydration: new and requires workup  Elevated troponin: new and requires workup  Elevated white blood cell count: new and requires workup  Tachycardia: new and requires workup  Tachypnea: new and requires workup  Diagnosis management comments: Patient presents for left knee pain, however was found to be tachycardic and tachypneic  Patient has a history of chest pain, but is stating that again her chest hurts    Patient does have an elevated troponin, unlikely be cardiac in nature per Cardiology, as well as decreasing at the 2 hour troponin jelena  Patient does have a elevated creatinine, possible dehydration at this time  Patient does have an elevated BNP as well, however does not have a history of getting BNPs, and no baseline  Chest x-ray and patient's leg showed no sign of volume overload at this time  Patient will be admitted for further evaluation    Counseling: I had a detailed discussion with the patient and/or guardian regarding: the historical points, exam findings, and any diagnostic results supporting the discharge diagnosis, lab results, radiology results, discharge instructions reviewed with patient and/or family/caregiver and understanding was verbalized  Instructions given to return to the emergency department if symptoms worsen or persist, or if there are any questions or concerns that arise at home       All labs reviewed and utilized in the medical decision making process     All radiology studies independently viewed by me and interpreted by the radiologist     Portions of the record may have been created with voice recognition software   Occasional wrong word or "sound a like" substitutions may have occurred due to the inherent limitations of voice recognition software   Read the chart carefully and recognize, using context, where substitutions have occurred           Amount and/or Complexity of Data Reviewed  Clinical lab tests: ordered and reviewed  Tests in the radiology section of CPT®: ordered and reviewed    Risk of Complications, Morbidity, and/or Mortality  Presenting problems: low  Diagnostic procedures: minimal  Management options: minimal    Patient Progress  Patient progress: stable      Disposition  Final diagnoses:   Dehydration   Elevated troponin   Tachycardia   Tachypnea   Elevated white blood cell count     Time reflects when diagnosis was documented in both MDM as applicable and the Disposition within this note     Time User Action Codes Description Comment    10/31/2022  2:10 PM Rubi Holley Jose Dickey [E86 0] Dehydration     10/31/2022  2:10 PM Mar Gaster Add [N17 9] SONAM (acute kidney injury) ([facility] [address] )     10/31/2022  2:10 PM Mar Gaster Add [R77 8] Elevated troponin     10/31/2022  2:10 PM John Molina [R00 0] Tachycardia     10/31/2022  2:10 PM Mar Gaster Add [R06 82] Tachypnea     10/31/2022  2:11 PM Mar Gaster Add [O82 554] Elevated white blood cell count     10/31/2022  2:21 PM Mar Gaster Remove [N17 9] SONAM (acute kidney injury) [facility])       ED Disposition     ED Disposition   Admit    Condition   Stable    Date/Time   Mon Oct 31, 2022  2:10 PM    Comment   Case was discussed with Dr Gabriele Wilson and the patient's admission status was agreed to be Admission Status: inpatient status to the service of Dr Ledy Kellogg   Follow-up Information    None         Patient's Medications   Discharge Prescriptions    No medications on file       No discharge procedures on file      PDMP Review     None          ED Provider  Electronically Signed by           Vimal Loya PA-C  10/31/22 7672

## 2022-10-31 NOTE — ASSESSMENT & PLAN NOTE
Sepsis now resolved  Urine culture positive for E coli  Patient denies urinary symptoms at discharge       Plan:   - Discharge patient home with nitrofurantoin 100 mg BID for 5 days

## 2022-10-31 NOTE — QUICK NOTE
Patient was seen tonight at bedside with senior resident, she was resting comfortably, in no respiratory distress  Patient reported that she was feeling better and was looking for her sandals because she wanted to walk around  Nurse reported that patient was starting to get agitated and daughter agreed with physical restraints if they become necessary  Patient was oriented in time, person, but no place  Pulmonary auscultation was normal  Although latanya has tachypnea, nurse reports that this is her baseline comparing to her last admission  Cardiovascular exam was significant for early systolic murmur  No LE edema  No suprapubic or CVA tenderness noted         #delirium precautions   - patient is high risk of delirium due to dementia   - initiate delirium precautions   - maintain normal sleep/wake cycle   - minimize overnight interruptions, group overnight vitals/labs/nursing checks as possible   - dim lights, close blinds and turn off tv to minimize stimulation and encourage sleep environment in evenings   - ensure that pain is well controlled   - monitor for fecal and urinary retention which may precipitate delirium   - provide frequent reorientation and redirection   - if reorientation is precipitating agitation and patient is not in harm of harming self or others please redirect and defer reorientation to prevent contributing to agitation/need for physical/chemical restraints   - avoid medications which may precipitate or worsen delirium such as tramadol, benzodiazepine, anticholinergics, and benadryl   - encourage hydration and nutrition    - redirect unwanted behaviors as first line, avoid physical restraints, use chemical restraint only if all other attempts have been unsuccessful, would consider Zyprexa 2 5mg IM Q, monitor for orthostatic hypotension and QTc prolongation with repeat dosing, recommend lowest dose possible for shortest duration possible   - Dicussed with nurse that we will prefer to do chemical restraints before physical as per orientation of Geriatrics team during last admission

## 2022-10-31 NOTE — ASSESSMENT & PLAN NOTE
Resolved  ACS ruled out  HEART Score: 5pts (Moderate risk)    Of note: prior UTI episodes have presented with chest pain

## 2022-11-01 LAB
ANION GAP SERPL CALCULATED.3IONS-SCNC: 10 MMOL/L (ref 5–14)
ATRIAL RATE: 109 BPM
BASOPHILS # BLD AUTO: 0.06 THOUSANDS/ÂΜL (ref 0–0.1)
BASOPHILS NFR BLD AUTO: 1 % (ref 0–1)
BUN SERPL-MCNC: 34 MG/DL (ref 5–25)
CALCIUM SERPL-MCNC: 8.9 MG/DL (ref 8.4–10.2)
CHLORIDE SERPL-SCNC: 108 MMOL/L (ref 96–108)
CO2 SERPL-SCNC: 20 MMOL/L (ref 21–32)
CREAT SERPL-MCNC: 1.56 MG/DL (ref 0.6–1.2)
EOSINOPHIL # BLD AUTO: 0.24 THOUSAND/ÂΜL (ref 0–0.61)
EOSINOPHIL NFR BLD AUTO: 3 % (ref 0–6)
ERYTHROCYTE [DISTWIDTH] IN BLOOD BY AUTOMATED COUNT: 13.2 % (ref 11.6–15.1)
GFR SERPL CREATININE-BSD FRML MDRD: 29 ML/MIN/1.73SQ M
GLUCOSE SERPL-MCNC: 93 MG/DL (ref 70–99)
HCT VFR BLD AUTO: 26.7 % (ref 34.8–46.1)
HGB BLD-MCNC: 9.4 G/DL (ref 11.5–15.4)
IMM GRANULOCYTES # BLD AUTO: 0.03 THOUSAND/UL (ref 0–0.2)
IMM GRANULOCYTES NFR BLD AUTO: 0 % (ref 0–2)
LYMPHOCYTES # BLD AUTO: 2.22 THOUSANDS/ÂΜL (ref 0.6–4.47)
LYMPHOCYTES NFR BLD AUTO: 25 % (ref 14–44)
MCH RBC QN AUTO: 30.6 PG (ref 26.8–34.3)
MCHC RBC AUTO-ENTMCNC: 35.2 G/DL (ref 31.4–37.4)
MCV RBC AUTO: 87 FL (ref 82–98)
MONOCYTES # BLD AUTO: 1.31 THOUSAND/ÂΜL (ref 0.17–1.22)
MONOCYTES NFR BLD AUTO: 15 % (ref 4–12)
NEUTROPHILS # BLD AUTO: 4.88 THOUSANDS/ÂΜL (ref 1.85–7.62)
NEUTS SEG NFR BLD AUTO: 56 % (ref 43–75)
NRBC BLD AUTO-RTO: 0 /100 WBCS
P AXIS: 70 DEGREES
PLATELET # BLD AUTO: 370 THOUSANDS/UL (ref 149–390)
PMV BLD AUTO: 10.5 FL (ref 8.9–12.7)
POTASSIUM SERPL-SCNC: 4.6 MMOL/L (ref 3.5–5.3)
PR INTERVAL: 150 MS
QRS AXIS: -16 DEGREES
QRSD INTERVAL: 62 MS
QT INTERVAL: 328 MS
QTC INTERVAL: 441 MS
RBC # BLD AUTO: 3.07 MILLION/UL (ref 3.81–5.12)
SODIUM SERPL-SCNC: 138 MMOL/L (ref 135–147)
T WAVE AXIS: 21 DEGREES
VENTRICULAR RATE: 109 BPM
WBC # BLD AUTO: 8.74 THOUSAND/UL (ref 4.31–10.16)

## 2022-11-01 RX ORDER — SODIUM CHLORIDE 9 MG/ML
125 INJECTION, SOLUTION INTRAVENOUS CONTINUOUS
Status: DISCONTINUED | OUTPATIENT
Start: 2022-11-01 | End: 2022-11-01

## 2022-11-01 RX ORDER — POLYETHYLENE GLYCOL 3350 17 G/17G
17 POWDER, FOR SOLUTION ORAL DAILY
Status: DISCONTINUED | OUTPATIENT
Start: 2022-11-01 | End: 2022-11-02 | Stop reason: HOSPADM

## 2022-11-01 RX ADMIN — QUETIAPINE FUMARATE 25 MG: 25 TABLET ORAL at 21:32

## 2022-11-01 RX ADMIN — ACETAMINOPHEN 650 MG: 325 TABLET ORAL at 11:05

## 2022-11-01 RX ADMIN — HEPARIN SODIUM 5000 UNITS: 5000 INJECTION INTRAVENOUS; SUBCUTANEOUS at 08:43

## 2022-11-01 RX ADMIN — BISOPROLOL FUMARATE 2.5 MG: 5 TABLET ORAL at 08:43

## 2022-11-01 RX ADMIN — CEFEPIME HYDROCHLORIDE 1000 MG: 1 INJECTION, SOLUTION INTRAVENOUS at 21:36

## 2022-11-01 RX ADMIN — GABAPENTIN 100 MG: 100 CAPSULE ORAL at 21:31

## 2022-11-01 RX ADMIN — PRAVASTATIN SODIUM 40 MG: 40 TABLET ORAL at 08:43

## 2022-11-01 RX ADMIN — DICLOFENAC SODIUM 2 G: 10 GEL TOPICAL at 08:50

## 2022-11-01 RX ADMIN — POLYETHYLENE GLYCOL 3350 17 G: 17 POWDER, FOR SOLUTION ORAL at 11:05

## 2022-11-01 RX ADMIN — SODIUM CHLORIDE 125 ML/HR: 0.9 INJECTION, SOLUTION INTRAVENOUS at 11:45

## 2022-11-01 RX ADMIN — AMLODIPINE BESYLATE 10 MG: 10 TABLET ORAL at 08:43

## 2022-11-01 RX ADMIN — ACETAMINOPHEN 650 MG: 325 TABLET ORAL at 06:04

## 2022-11-01 RX ADMIN — DICLOFENAC SODIUM 2 G: 10 GEL TOPICAL at 21:37

## 2022-11-01 RX ADMIN — CLOPIDOGREL BISULFATE 75 MG: 75 TABLET ORAL at 08:43

## 2022-11-01 RX ADMIN — DICLOFENAC SODIUM 2 G: 10 GEL TOPICAL at 11:46

## 2022-11-01 RX ADMIN — HEPARIN SODIUM 5000 UNITS: 5000 INJECTION INTRAVENOUS; SUBCUTANEOUS at 21:35

## 2022-11-01 RX ADMIN — MELATONIN TAB 3 MG 3 MG: 3 TAB at 21:31

## 2022-11-01 NOTE — ASSESSMENT & PLAN NOTE
BP today 138/80  Goal <150/90 per J[facility]  Improved status post discontinuation of Losartan 100mg       Plan:  - Discharge home with amlodipine 10 mg tablet, Bisoprolol 5mg  - Follow up with PCP

## 2022-11-01 NOTE — UTILIZATION REVIEW
Initial Clinical Review    Admission: Date/Time/Statement:   Admission Orders (From admission, onward)     Ordered        10/31/22 1411  INPATIENT ADMISSION  Once                      Orders Placed This Encounter   Procedures   • INPATIENT ADMISSION     Standing Status:   Standing     Number of Occurrences:   1     Order Specific Question:   Level of Care     Answer:   Med Surg [16]     Order Specific Question:   Estimated length of stay     Answer:   More than 2 Midnights     Order Specific Question:   Certification     Answer:   I certify that inpatient services are medically necessary for this patient for a duration of greater than two midnights  See H&P and MD Progress Notes for additional information about the patient's course of treatment  ED Arrival Information     Expected   -    Arrival   10/31/2022 10:08    Acuity   Urgent            Means of arrival   Wheelchair    Escorted by   Family Member    Service   Hospitalist    Admission type   Emergency            Arrival complaint   Knee Pain           Chief Complaint   Patient presents with   • Knee Pain     Left - pt complaining of pain since 1 week, got worse today     • Arm Pain     Right- No fall         Initial Presentation: 80 y o  female who presented self from home to [facility]  Inpatient admission for evaluation and treatment of sepsis  PMHx: Dementia, HLD, HTN, vaginal prolapse  Presented w/ increased L knee pain and inability to ambulate  On exam, tachycardic, rhonchi, abdominal tenderness, vaginal discharge  WBC 12 33  Crt 1 95  BNP 2,190  Plan: IV ABX, IVF, Trend labs, replete electrolytes as needed; follow blood and urine cultures, continue PTA meds  Date: 11/01/22   Day 2: Pt agitated overnight, but able to be redirected  Pt unclear on what her symptoms are  Oriented to self  On exam, tachycardic, murmur   Plan: continue IV ABX, follow urine/blood cultures, Trend labs, replete electrolytes as needed; analgesics, continue PTA meds  ED Triage Vitals   Temperature Pulse Respirations Blood Pressure SpO2   10/31/22 1039 10/31/22 1039 10/31/22 1039 10/31/22 1039 10/31/22 1039   98 °F (36 7 °C) (!) 120 (!) 24 108/62 99 %      Temp Source Heart Rate Source Patient Position - Orthostatic VS BP Location FiO2 (%)   10/31/22 1039 10/31/22 1039 10/31/22 1039 10/31/22 1039 --   Oral Monitor Sitting Left arm       Pain Score       10/31/22 1714       4          Wt Readings from Last 1 Encounters:   11/01/22 55 kg (121 lb 4 1 oz)     Additional Vital Signs:   Date/Time Temp Pulse Resp BP MAP (mmHg) SpO2 O2 Device   11/01/22 0737 97 4 °F (36 3 °C) 103 16 118/70 90 96 % None (Room air)   10/31/22 2308 98 5 °F (36 9 °C) 112 Abnormal  22 133/72 -- 94 % None (Room air)   10/31/22 1633 99 2 °F (37 3 °C) 124 Abnormal  24 Abnormal  153/82 106 98 % None (Room air)   10/31/22 1330 -- 112 Abnormal  -- 114/84 -- 97 % None (Room air)   10/31/22 1322 -- 111 Abnormal  32 Abnormal  126/68 -- 98 % None (Room air)   10/31/22 1300 -- 113 Abnormal  26 Abnormal  132/68 -- 98 % None (Room air)   10/31/22 1150 -- 98 28 Abnormal  117/60 -- 100 % None (Room air)       Pertinent Labs/Diagnostic Test Results:   CT chest abdomen pelvis wo contrast   Final Result by Deandra Singleton MD (10/31 1316)      1  No acute intrathoracic abnormality   2  No acute inflammatory changes in the abdomen or pelvis   3  Multiple small bladder diverticula   4  Unchanged severe compression deformity of T11   5   sigmoid diverticulosis                     Workstation performed: [id number]         X-ray chest 1 view portable   Final Result by Hannah Del Toro MD (10/31 1148)      No acute cardiopulmonary disease                    Workstation performed: [id number]           Results from last 7 days   Lab Units 10/31/22  1110   SARS-COV-2  Negative     Results from last 7 days   Lab Units 11/01/22  0506 10/31/22  1110   WBC Thousand/uL 8 74 12 33*   HEMOGLOBIN g/dL 9 4* 10 4* HEMATOCRIT % 26 7* 30 3*   PLATELETS Thousands/uL 370 419*   NEUTROS ABS Thousands/µL 4 88 8 69*         Results from last 7 days   Lab Units 11/01/22  0506 10/31/22  1110   SODIUM mmol/L 138 138   POTASSIUM mmol/L 4 6 4 9   CHLORIDE mmol/L 108 104   CO2 mmol/L 20* 20*   ANION GAP mmol/L 10 14   BUN mg/dL 34* 41*   CREATININE mg/dL 1 56* 1 95*   EGFR ml/min/1 73sq m 29 22   CALCIUM mg/dL 8 9 9 7   MAGNESIUM mg/dL  --  2 0     Results from last 7 days   Lab Units 10/31/22  1110   AST U/L 26   ALT U/L 22   ALK PHOS U/L 117   TOTAL PROTEIN g/dL 8 2   ALBUMIN g/dL 4 4   TOTAL BILIRUBIN mg/dL 0 50         Results from last 7 days   Lab Units 11/01/22  0506 10/31/22  1110   GLUCOSE RANDOM mg/dL 93 136*     Results from last 7 days   Lab Units 10/31/22  1524 10/31/22  1317 10/31/22  1110   HS TNI 0HR ng/L  --   --  81*   HS TNI 2HR ng/L  --  76*  --    HSTNI D2 ng/L  --  -5  --    HS TNI 4HR ng/L 64*  --   --    HSTNI D4 ng/L -17  --   --      Results from last 7 days   Lab Units 10/31/22  1113   LACTIC ACID mmol/L 1 7             Results from last 7 days   Lab Units 10/31/22  1110   NT-PRO BNP pg/mL 2,190*             Results from last 7 days   Lab Units 10/31/22  1110   LIPASE u/L 275     Results from last 7 days   Lab Units 10/31/22  1847   CLARITY UA  Cloudy*   COLOR UA  Straw   SPEC GRAV UA  1 010   PH UA  6 0   GLUCOSE UA mg/dl Negative   KETONES UA mg/dl Negative   BLOOD UA  25 0*   PROTEIN UA mg/dl 30 (1+)*   NITRITE UA  Negative   BILIRUBIN UA  Negative   UROBILINOGEN UA mg/dL Negative   LEUKOCYTES UA  500 0*   WBC UA /hpf Innumerable*   RBC UA /hpf 1-2   BACTERIA UA /hpf Moderate*   EPITHELIAL CELLS WET PREP /hpf Occasional     Results from last 7 days   Lab Units 10/31/22  1110   INFLUENZA A PCR  Negative   INFLUENZA B PCR  Negative   RSV PCR  Negative     Results from last 7 days   Lab Units 10/31/22  1113 10/31/22  1110   BLOOD CULTURE  Received in Microbiology Lab  Culture in Progress   Received in Microbiology Lab  Culture in Progress  ED Treatment:   Medication Administration from 10/31/2022 1008 to 10/31/2022 1648       Date/Time Order Dose Route Action Comments     10/31/2022 1150 sodium chloride 0 9 % infusion 125 mL/hr Intravenous New Bag         Past Medical History:   Diagnosis Date   • Dementia ([facility] )    • HLD (hyperlipidemia)    • HTN (hypertension)    • Vaginal prolapse      Present on Admission:  • Sepsis ([facility] )  • Chronic kidney disease  • HTN (hypertension)  • Dementia ([facility] )  • (Resolved) Ambulatory dysfunction  • Chronic pain of left knee  • Hyperlipidemia      Admitting Diagnosis: Tachypnea [R06 82]  Dehydration [E86 0]  Arm pain [M79 603]  Knee pain [M25 569]  Tachycardia [R00 0]  Elevated white blood cell count [D72 829]  Elevated troponin [R77 8]  Age/Sex: 80 y o  female  Admission Orders:  Dysphagia Mechanical Soft Diet  Pudding Thick Liquids  Aspiration/Fall precautions  DW   I&O  SCDs  Scheduled Medications:  acetaminophen, 650 mg, Oral, Q6H  amLODIPine, 10 mg, Oral, Daily  bisoprolol, 2 5 mg, Oral, Daily  cefepime, 1,000 mg, Intravenous, Q24H  clopidogrel, 75 mg, Oral, Daily  Diclofenac Sodium, 2 g, Topical, 4x Daily  gabapentin, 100 mg, Oral, HS  heparin (porcine), 5,000 Units, Subcutaneous, Q12H PEDRITO  melatonin, 3 mg, Oral, HS  pravastatin, 40 mg, Oral, Daily  QUEtiapine, 25 mg, Oral, HS    Continuous IV Infusions:  sodium chloride, 125 mL/hr, Intravenous, Continuous    PRN Meds:  OLANZapine, 2 5 mg, Intramuscular, Once PRN  sodium chloride (PF), 3 mL, Intravenous, Q1H PRN  sodium chloride 0 9 %, 1,000 mL bolus, Intravenous, 10/31 x1        Network Utilization Review Department  ATTENTION: Please call with any questions or concerns to [phone] and carefully listen to the prompts so that you are directed to the right person   All voicemails are confidential   Julia Linn all requests for admission clinical reviews, approved or denied determinations and any other requests to dedicated fax number below belonging to the campus where the patient is receiving treatment   List of dedicated fax numbers for the Facilities:  [address] [place] [address] DENIALS (Administrative/Medical Necessity) [phone]   [address] (Maternity/NICU/Pediatrics) [phone]   [address] [phone]   [address] [phone]   [address] [place] [address] [facility] [phone]   [address] [facility] [address] [facility] [phone]

## 2022-11-01 NOTE — ASSESSMENT & PLAN NOTE
Lipid panel from 2/1/22: cholesterol 205, , HDL 59,     Plan:  - Discharge home with rosuvastatin 10 mg

## 2022-11-01 NOTE — ASSESSMENT & PLAN NOTE
Could be contributory to current dementia (vascular)  Daughter denies any history of DVT  - continue her home clopidogrel 75mg

## 2022-11-01 NOTE — PLAN OF CARE
Problem: MOBILITY - ADULT  Goal: Maintain or return to baseline ADL function  Description: INTERVENTIONS:  -  Assess patient's ability to carry out ADLs; assess patient's baseline for ADL function and identify physical deficits which impact ability to perform ADLs (bathing, care of mouth/teeth, toileting, grooming, dressing, etc )  - Assess/evaluate cause of self-care deficits   - Assess range of motion  - Assess patient's mobility; develop plan if impaired  - Assess patient's need for assistive devices and provide as appropriate  - Encourage maximum independence but intervene and supervise when necessary  - Involve family in performance of ADLs  - Assess for home care needs following discharge   - Consider OT consult to assist with ADL evaluation and planning for discharge  - Provide patient education as appropriate  Outcome: Progressing     Problem: Prexisting or High Potential for Compromised Skin Integrity  Goal: Skin integrity is maintained or improved  Description: INTERVENTIONS:  - Identify patients at risk for skin breakdown  - Assess and monitor skin integrity  - Assess and monitor nutrition and hydration status  - Monitor labs   - Assess for incontinence   - Turn and reposition patient  - Assist with mobility/ambulation  - Relieve pressure over bony prominences  - Avoid friction and shearing  - Provide appropriate hygiene as needed including keeping skin clean and dry  - Evaluate need for skin moisturizer/barrier cream  - Collaborate with interdisciplinary team   - Patient/family teaching    Outcome: Progressing

## 2022-11-01 NOTE — ASSESSMENT & PLAN NOTE
Patient currently at baseline  Oriented to person and place  Good support at home   Has appointment with Geriatrics in 4/19/2023    Plan:  - Discharge with home medications: gabapentin 100 mg, memantine 10 mg, melatonin 3mg

## 2022-11-01 NOTE — OCCUPATIONAL THERAPY NOTE
Occupational Therapy Evaluation     Patient Name: Favio Mclean  TIDGC'G Date: 11/1/2022  Problem List  Principal Problem:    Sepsis ([facility] )  Active Problems:    HTN (hypertension)    Chronic kidney disease    Dementia ([facility] )    Other chest pain    Chronic pain of left knee    Hyperlipidemia    History of CVA (cerebrovascular accident)    Past Medical History  Past Medical History:   Diagnosis Date    Dementia ([facility] )     HLD (hyperlipidemia)     HTN (hypertension)     Vaginal prolapse      Past Surgical History  No past surgical history on file  11/01/22    OT Last Visit   OT Visit Date 11/01/22   Note Type   Note type Evaluation   Pain Assessment   Pain Assessment Tool 0-10   Pain Score No Pain   Home Living   Additional Comments limited information due to cognitive deficits   Prior Function   Level of [place] Needs assistance with ADLs; Needs assistance with IADLS   Lives With Daughter   [place] From Family   ADL   Grooming Assistance 5  Supervision/Setup   UB Bathing Assistance 5  Supervision/Setup   LB Bathing Assistance 5  Supervision/Setup   UB Dressing Assistance 5  Supervision/Setup   LB Dressing Assistance 4  Minimal Assistance   LB Dressing Deficit Thread RLE into underwear; Thread LLE into underwear;Pull up over hips   Toileting Assistance  4  Minimal Assistance   Toileting Deficit Clothing management up;Clothing management down;Perineal hygiene   Transfers   Sit to Stand 4  Minimal assistance   Additional items Assist x 1; Increased time required   Stand to Sit 4  Minimal assistance   Additional items Assist x 1; Increased time required   Functional Mobility   Functional Mobility 4  Minimal assistance   Additional Comments short distances   Additional items Rolling walker   Balance   Static Sitting Good   Dynamic Sitting Fair +   Static Standing Fair   Dynamic Standing Fair   Ambulatory Fair -   Activity Tolerance   Activity Tolerance Patient tolerated treatment well   RUE Assessment RUE Assessment WFL   LUE Assessment   LUE Assessment WFL   Cognition   Arousal/Participation Alert   Attention Attends with cues to redirect   Orientation Level Disoriented to situation;Disoriented to time;Disoriented to place;Oriented to person   Memory Decreased short term memory;Decreased recall of recent events;Decreased recall of precautions   Following Commands Follows one step commands with increased time or repetition   Assessment   Limitation Decreased ADL status; Decreased high-level ADLs; Decreased endurance;Decreased Safe judgement during ADL;Decreased UE strength   Prognosis Fair   Assessment   Pt is a 80 y o  female seen for OT evaluation s/p admit to [facility] on 10/31/2022 w/ Sepsis ([facility] ), also with baseline dementia, c/o L knee pain  See above for extensive list of comorbidities affecting Pt's functional performance at time of assessment  Personal factors affecting Pt at time of IE include:behavioral pattern, difficulty performing ADLS, difficulty performing IADLS , limited insight into deficits, and health management   Unclear of exact PLOF due to Pt being a poor historian  Upon evaluation: Pt requires Bunny for transfers to standing and ambulation in room- notably limited by knee pain with weightbearing  Supervision for all functional tasks as Pt limited by poor cognition due to dementia, not oriented to situation and requires cues for safety  The following deficits impact occupational performance: weakness, decreased strength, decreased balance, decreased tolerance, impaired memory, impaired sequencing, impaired problem solving, and decreased safety awareness  Pt to benefit from continued skilled OT services while in the hospital to address deficits as defined above and maximize level of functional independence w ADL's and functional mobility  Occupational performance areas to address include: bathing/shower, toilet hygiene, dressing, and functional mobility   From OT standpoint, recommendation at time of d/c would be home with resumed assist vs post acute rehab  Pt appears to be functioning slightly below baseline with transfers and mobility vs previous admission  Unclear at this time what level of assist family is able to provide for Pt at home  Goals   STG Time Frame   (1 week)   Short Term Goals Pt will complete functional ambulation with supervision  Pt will complete toileting with supervision  Pt will complete ADL transfers with supervision  Plan   Treatment Interventions ADL retraining;Functional transfer training;UE strengthening/ROM; Endurance training;Continued evaluation; Activityengagement   Goal Expiration Date 11/08/22   OT Frequency 1-2x/wk   Recommendation   OT Discharge Recommendation   (home with resumed assist vs post acute rehab)

## 2022-11-01 NOTE — PLAN OF CARE
Problem: OCCUPATIONAL THERAPY ADULT  Goal: Performs self-care activities at highest level of function for planned discharge setting  See evaluation for individualized goals  Description: Treatment Interventions: ADL retraining, Functional transfer training, UE strengthening/ROM, Endurance training, Continued evaluation, Activityengagement          See flowsheet documentation for full assessment, interventions and recommendations  Note: Limitation: Decreased ADL status, Decreased high-level ADLs, Decreased endurance, Decreased Safe judgement during ADL, Decreased UE strength  Prognosis: Fair  Assessment: Pt is a 80 y o  female seen for OT evaluation s/p admit to [facility] on 10/31/2022 w/ Sepsis ([facility] ), also with baseline dementia, c/o L knee pain  See above for extensive list of comorbidities affecting Pt's functional performance at time of assessment  Personal factors affecting Pt at time of IE include:behavioral pattern, difficulty performing ADLS, difficulty performing IADLS , limited insight into deficits, and health management   Unclear of exact PLOF due to Pt being a poor historian  Upon evaluation: Pt requires Bunny for transfers to standing and ambulation in room- notably limited by knee pain with weightbearing  Supervision for all functional tasks as Pt limited by poor cognition due to dementia, not oriented to situation and requires cues for safety  The following deficits impact occupational performance: weakness, decreased strength, decreased balance, decreased tolerance, impaired memory, impaired sequencing, impaired problem solving, and decreased safety awareness  Pt to benefit from continued skilled OT services while in the hospital to address deficits as defined above and maximize level of functional independence w ADL's and functional mobility  Occupational performance areas to address include: bathing/shower, toilet hygiene, dressing, and functional mobility   From OT standpoint, recommendation at time of d/c would be home with resumed assist vs post acute rehab  Pt appears to be functioning slightly below baseline with transfers and mobility vs previous admission  Unclear at this time what level of assist family is able to provide for Pt at home          OT Discharge Recommendation:  (home with resumed assist vs post acute rehab)

## 2022-11-01 NOTE — PLAN OF CARE
Problem: MOBILITY - ADULT  Goal: Maintain or return to baseline ADL function  Description: INTERVENTIONS:  -  Assess patient's ability to carry out ADLs; assess patient's baseline for ADL function and identify physical deficits which impact ability to perform ADLs (bathing, care of mouth/teeth, toileting, grooming, dressing, etc )  - Assess/evaluate cause of self-care deficits   - Assess range of motion  - Assess patient's mobility; develop plan if impaired  - Assess patient's need for assistive devices and provide as appropriate  - Encourage maximum independence but intervene and supervise when necessary  - Involve family in performance of ADLs  - Assess for home care needs following discharge   - Consider OT consult to assist with ADL evaluation and planning for discharge  - Provide patient education as appropriate  Outcome: Progressing  Goal: Maintains/Returns to pre admission functional level  Description: INTERVENTIONS:  - Perform BMAT or MOVE assessment daily    - Set and communicate daily mobility goal to care team and patient/family/caregiver     - Collaborate with rehabilitation services on mobility goals if consulted  - Record patient progress and toleration of activity level   Outcome: Progressing     Problem: INFECTION - ADULT  Goal: Absence or prevention of progression during hospitalization  Description: INTERVENTIONS:  - Assess and monitor for signs and symptoms of infection  - Monitor lab/diagnostic results  - Monitor all insertion sites, i e  indwelling lines, tubes, and drains  - Monitor endotracheal if appropriate and nasal secretions for changes in amount and color  - [place] appropriate cooling/warming therapies per order  - Administer medications as ordered  - Instruct and encourage patient and family to use good hand hygiene technique  - Identify and instruct in appropriate isolation precautions for identified infection/condition  Outcome: Progressing     Problem: SAFETY ADULT  Goal: Maintain or return to baseline ADL function  Description: INTERVENTIONS:  -  Assess patient's ability to carry out ADLs; assess patient's baseline for ADL function and identify physical deficits which impact ability to perform ADLs (bathing, care of mouth/teeth, toileting, grooming, dressing, etc )  - Assess/evaluate cause of self-care deficits   - Assess range of motion  - Assess patient's mobility; develop plan if impaired  - Assess patient's need for assistive devices and provide as appropriate  - Encourage maximum independence but intervene and supervise when necessary  - Involve family in performance of ADLs  - Assess for home care needs following discharge   - Consider OT consult to assist with ADL evaluation and planning for discharge  - Provide patient education as appropriate  Outcome: Progressing  Goal: Maintains/Returns to pre admission functional level  Description: INTERVENTIONS:  - Perform BMAT or MOVE assessment daily    - Set and communicate daily mobility goal to care team and patient/family/caregiver     - Collaborate with rehabilitation services on mobility goals if consulted  - Out of bed for toileting  - Record patient progress and toleration of activity level   Outcome: Progressing  Goal: Patient will remain free of falls  Description: INTERVENTIONS:  - Educate patient/family on patient safety including physical limitations  - Instruct patient to call for assistance with activity   - Consult OT/PT to assist with strengthening/mobility   - Keep Call bell within reach  - Keep bed low and locked with side rails adjusted as appropriate  - Keep care items and personal belongings within reach  - Initiate and maintain comfort rounds  - Make Fall Risk Sign visible to staff  - Apply yellow socks and bracelet for high fall risk patients  - Consider moving patient to room near nurses station  Outcome: Progressing     Problem: DISCHARGE PLANNING  Goal: Discharge to home or other facility with appropriate resources  Description: INTERVENTIONS:  - Identify barriers to discharge w/patient and caregiver  - Arrange for needed discharge resources and transportation as appropriate  - Identify discharge learning needs (meds, wound care, etc )  - Arrange for interpretive services to assist at discharge as needed  - Refer to Case Management Department for coordinating discharge planning if the patient needs post-hospital services based on physician/advanced practitioner order or complex needs related to functional status, cognitive ability, or social support system  Outcome: Progressing     Problem: Knowledge Deficit  Goal: Patient/family/caregiver demonstrates understanding of disease process, treatment plan, medications, and discharge instructions  Description: Complete learning assessment and assess knowledge base    Interventions:  - Provide teaching at level of understanding  - Provide teaching via preferred learning methods  Outcome: Progressing     Problem: Prexisting or High Potential for Compromised Skin Integrity  Goal: Skin integrity is maintained or improved  Description: INTERVENTIONS:  - Identify patients at risk for skin breakdown  - Assess and monitor skin integrity  - Assess and monitor nutrition and hydration status  - Monitor labs   - Assess for incontinence   - Turn and reposition patient  - Assist with mobility/ambulation  - Relieve pressure over bony prominences  - Avoid friction and shearing  - Provide appropriate hygiene as needed including keeping skin clean and dry  - Evaluate need for skin moisturizer/barrier cream  - Collaborate with interdisciplinary team   - Patient/family teaching    Outcome: Progressing

## 2022-11-01 NOTE — QUICK NOTE
Nurse contacted at 2214 stating that patient was very restless and trying to climb out of bed  Went to see the patient and noted that she was agitated asking for her daughter and wanted to remove foot pumps to get out of bed  Bedroom's light and TV were off  She was oriented to person only  Patient was reoriented and explained that her daughter will be back in the morning  Foot pumps were removed for comfort  Patient improved after reorientation  Gabapentin and melatonin were given at 2109  Plan:  - Reorientation   - Delirium precautions   - Avoid physical restraints   - Zyprexa 2 5 mg IV once PRN ordered

## 2022-11-01 NOTE — PROGRESS NOTES
Progress Note    Azucena Nuñez 80 y o  female MRN: [id number]  Unit/Bed#: 7T [facility] 716-01 Encounter: [id number]  Admitting Physician: Rudolph Pope  PCP: Jayme Ty MD  Date of Admission:  10/31/2022 10:39 AM    Assessment and Plan    * Sepsis [facility])  Assessment & Plan  Secondary to UTI  Sepsis now resolved  - continue empiric antibiotics: renally dosed cefepime at 1g q24hrs   - FU urine and blood cultures for sensitivities, switch to appropriate PO antibiotics  - AM CBC, BMP    Other chest pain  Assessment & Plan  Resolved  ACS ruled out  HEART Score: 5pts (Moderate risk)    Of note: prior UTI episodes have presented with chest pain  Chronic pain of left knee  Assessment & Plan  With ambulatory dysfunction  - tylenol 650 mg q8  - topical voltaren 1%  -  PT eval     HTN (hypertension)  Assessment & Plan  BP today 118/70  Goal <150/90 per [facility]  Improved status post discontinuation of Losartan 100mg      - continue home medications: Amlodipine 10 mg tablet, Bisoprolol 5mg    Dementia (HCC)  Assessment & Plan  Delirium precautions      - continue home medications: gabapentin 100 mg, memantine 10 mg, melatonin 3mg   - restarting her on the Quetiapine 25hs   - maintain adequate PO/hydration  - maintain daily orientation, and sleep/wake cycle  - fall precautions  - adding Miralax daily for bowel regimen      Chronic kidney disease  Assessment & Plan  Creatinine downtrending 1 95 >> 1 56    - continue to trend BMP  - maintain good hydration/PO  - strict I/O + weight    Hyperlipidemia  Assessment & Plan    - continue rosuvastatin 10 mg    History of CVA (cerebrovascular accident)  Assessment & Plan  Could be contributory to current dementia (vascular)  Daughter denies any history of DVT  - continue home clopidogrel 75mg          VTE Pharmacologic Prophylaxis:   Pharmacologic: Heparin  Mechanical VTE Prophylaxis in Place: yes    Patient Centered Rounds: I have performed bedside rounds with nursing staff today  Discussions with Specialists or Other Care Team Provider:     Education and Discussions with Family / Patient: Daughter  Patient Information Sharing: With the consent of Yue Preciado , their loved ones (insert name(s) here ) were notified today by inpatient team of the patient’s condition and current plan  All questions answered  Time Spent for Care: 45 minutes  More than 50% of total time spent on counseling and coordination of care as described above  Current Length of Stay: 1 day(s)    Current Patient Status: Inpatient   Certification Statement: The patient will continue to require additional inpatient hospital stay due to awaiting urine culture results  Discharge Plan: Waiting on urine culture results to better narrow her antibiotic treatment  Code Status: Level 1 - Full Code      Subjective:     Patient was agitated overnight, but able to be oriented at bedside  Completed her 1L bolus  IV was stopped at 4am to allow for proper sleep hygiene  This morning patient doing well, examined at bedside  Denies CP, SOB, suprapubic tenderness, or dysuria  However, oriented just to self, unclear what her symptoms really are  Objective:     Vitals:   Temp (24hrs), Av 3 °F (36 8 °C), Min:97 4 °F (36 3 °C), Max:99 2 °F (37 3 °C)    Temp:  [97 4 °F (36 3 °C)-99 2 °F (37 3 °C)] 97 4 °F (36 3 °C)  HR:  [] 103  Resp:  [16-32] 16  BP: (108-153)/(60-84) 118/70  SpO2:  [94 %-100 %] 96 %  Body mass index is 22 91 kg/m²  Input and Output Summary (last 24 hours): Intake/Output Summary (Last 24 hours) at 2022 1014  Last data filed at 2022 0900  Gross per 24 hour   Intake 240 ml   Output 400 ml   Net -160 ml       Physical Exam:     Physical Exam  Constitutional:       Appearance: Normal appearance  HENT:      Head: Normocephalic and atraumatic  Nose: Nose normal       Mouth/Throat:      Mouth: Mucous membranes are moist       Pharynx: Oropharynx is clear  Eyes:      Conjunctiva/sclera: Conjunctivae normal    Cardiovascular:      Rate and Rhythm: Regular rhythm  Tachycardia present  Heart sounds: Murmur (mild systolic on 2nd ICS) heard  Pulmonary:      Effort: Pulmonary effort is normal  No respiratory distress  Breath sounds: Normal breath sounds  No wheezing  Abdominal:      General: Abdomen is flat  Bowel sounds are normal       Palpations: Abdomen is soft  Musculoskeletal:      Right lower leg: No edema  Left lower leg: No edema  Skin:     General: Skin is warm  Neurological:      Mental Status: Mental status is at baseline  Comments: Only oriented to self  Additional Data:     Labs:    Results from last 7 days   Lab Units 11/01/22  0506   WBC Thousand/uL 8 74   HEMOGLOBIN g/dL 9 4*   HEMATOCRIT % 26 7*   PLATELETS Thousands/uL 370   NEUTROS PCT % 56   LYMPHS PCT % 25   MONOS PCT % 15*   EOS PCT % 3     Results from last 7 days   Lab Units 11/01/22  0506 10/31/22  1110   POTASSIUM mmol/L 4 6 4 9   CHLORIDE mmol/L 108 104   CO2 mmol/L 20* 20*   BUN mg/dL 34* 41*   CREATININE mg/dL 1 56* 1 95*   CALCIUM mg/dL 8 9 9 7   ALK PHOS U/L  --  117   ALT U/L  --  22   AST U/L  --  26                   * I Have Reviewed All Lab Data Listed Above  * Additional Pertinent Lab Tests Reviewed: All Labs Within Last 24 Hours Reviewed    Imaging:    No new imaging  Recent Cultures (last 7 days):     Results from last 7 days   Lab Units 10/31/22  1113 10/31/22  1110   BLOOD CULTURE  Received in Microbiology Lab  Culture in Progress  Received in Microbiology Lab  Culture in Progress         Last 24 Hours Medication List:   Current Facility-Administered Medications   Medication Dose Route Frequency Provider Last Rate   • acetaminophen  650 mg Oral Q6H Poonam Alas MD     • amLODIPine  10 mg Oral Daily Poonam Alas MD     • bisoprolol  2 5 mg Oral Daily Poonam Alas MD     • cefepime  1,000 mg Intravenous Q24H Lorenzo Goodpasture, MD 1,000 mg (10/31/22 1914)   • clopidogrel  75 mg Oral Daily Jerry Perdomo MD     • Diclofenac Sodium  2 g Topical 4x Daily Jerry Perdomo MD     • gabapentin  100 mg Oral HS Jerry Perdomo MD     • heparin (porcine)  5,000 Units Subcutaneous Q12H [address] Chaim West MD     • melatonin  3 mg Oral HS Jerry Perdomo MD     • OLANZapine  2 5 mg Intramuscular Once PRN Michael Briceño MD     • polyethylene glycol  17 g Oral Daily Chaim West MD     • pravastatin  40 mg Oral Daily Jerry Perdomo MD     • QUEtiapine  25 mg Oral HS Jerry Perdomo MD     • sodium chloride (PF)  3 mL Intravenous Q1H PRN Magaly Gillespie PA-C     • sodium chloride  125 mL/hr Intravenous Continuous Edla Mario Rojo MD          ** Please Note: Dictation voice to text software may have been used in the creation of this document   **    Uriel Carrillo  11/01/22  10:14 AM

## 2022-11-01 NOTE — ASSESSMENT & PLAN NOTE
Likely contributory to current dementia (vascular)  Daughter denies any history of DVT  Plan:  - Discharge with home medication: clopidogrel 75 mg qd

## 2022-11-02 ENCOUNTER — PATIENT OUTREACH (OUTPATIENT)
Dept: FAMILY MEDICINE CLINIC | Facility: CLINIC | Age: 87
End: 2022-11-02

## 2022-11-02 VITALS
OXYGEN SATURATION: 97 % | DIASTOLIC BLOOD PRESSURE: 80 MMHG | HEART RATE: 100 BPM | SYSTOLIC BLOOD PRESSURE: 138 MMHG | WEIGHT: 120.15 LBS | RESPIRATION RATE: 18 BRPM | TEMPERATURE: 97.2 F | BODY MASS INDEX: 22.68 KG/M2 | HEIGHT: 61 IN

## 2022-11-02 PROBLEM — R07.89 OTHER CHEST PAIN: Status: RESOLVED | Noted: 2022-10-31 | Resolved: 2022-11-02

## 2022-11-02 LAB
ANION GAP SERPL CALCULATED.3IONS-SCNC: 10 MMOL/L (ref 5–14)
BACTERIA UR CULT: ABNORMAL
BACTERIA UR CULT: ABNORMAL
BASOPHILS # BLD AUTO: 0.07 THOUSANDS/ÂΜL (ref 0–0.1)
BASOPHILS NFR BLD AUTO: 1 % (ref 0–1)
BUN SERPL-MCNC: 34 MG/DL (ref 5–25)
CALCIUM SERPL-MCNC: 9.8 MG/DL (ref 8.4–10.2)
CHLORIDE SERPL-SCNC: 107 MMOL/L (ref 96–108)
CO2 SERPL-SCNC: 22 MMOL/L (ref 21–32)
CREAT SERPL-MCNC: 1.39 MG/DL (ref 0.6–1.2)
EOSINOPHIL # BLD AUTO: 0.34 THOUSAND/ÂΜL (ref 0–0.61)
EOSINOPHIL NFR BLD AUTO: 4 % (ref 0–6)
ERYTHROCYTE [DISTWIDTH] IN BLOOD BY AUTOMATED COUNT: 13.1 % (ref 11.6–15.1)
GFR SERPL CREATININE-BSD FRML MDRD: 33 ML/MIN/1.73SQ M
GLUCOSE SERPL-MCNC: 102 MG/DL (ref 70–99)
HCT VFR BLD AUTO: 28.8 % (ref 34.8–46.1)
HGB BLD-MCNC: 10.1 G/DL (ref 11.5–15.4)
IMM GRANULOCYTES # BLD AUTO: 0.03 THOUSAND/UL (ref 0–0.2)
IMM GRANULOCYTES NFR BLD AUTO: 0 % (ref 0–2)
LYMPHOCYTES # BLD AUTO: 2.95 THOUSANDS/ÂΜL (ref 0.6–4.47)
LYMPHOCYTES NFR BLD AUTO: 33 % (ref 14–44)
MCH RBC QN AUTO: 30.5 PG (ref 26.8–34.3)
MCHC RBC AUTO-ENTMCNC: 35.1 G/DL (ref 31.4–37.4)
MCV RBC AUTO: 87 FL (ref 82–98)
MONOCYTES # BLD AUTO: 1.09 THOUSAND/ÂΜL (ref 0.17–1.22)
MONOCYTES NFR BLD AUTO: 12 % (ref 4–12)
NEUTROPHILS # BLD AUTO: 4.56 THOUSANDS/ÂΜL (ref 1.85–7.62)
NEUTS SEG NFR BLD AUTO: 50 % (ref 43–75)
NRBC BLD AUTO-RTO: 0 /100 WBCS
NT-PROBNP SERPL-MCNC: 1360 PG/ML (ref 0–299)
PLATELET # BLD AUTO: 424 THOUSANDS/UL (ref 149–390)
PMV BLD AUTO: 10.4 FL (ref 8.9–12.7)
POTASSIUM SERPL-SCNC: 5 MMOL/L (ref 3.5–5.3)
RBC # BLD AUTO: 3.31 MILLION/UL (ref 3.81–5.12)
SODIUM SERPL-SCNC: 139 MMOL/L (ref 135–147)
TSH SERPL DL<=0.05 MIU/L-ACNC: 1.63 UIU/ML (ref 0.45–4.5)
WBC # BLD AUTO: 9.04 THOUSAND/UL (ref 4.31–10.16)

## 2022-11-02 RX ORDER — NITROFURANTOIN 25; 75 MG/1; MG/1
100 CAPSULE ORAL 2 TIMES DAILY
Qty: 10 CAPSULE | Refills: 0 | Status: SHIPPED | OUTPATIENT
Start: 2022-11-03 | End: 2022-11-08

## 2022-11-02 RX ORDER — QUETIAPINE FUMARATE 25 MG/1
25 TABLET, FILM COATED ORAL
Qty: 30 TABLET | Refills: 0 | Status: SHIPPED | OUTPATIENT
Start: 2022-11-02

## 2022-11-02 RX ADMIN — ACETAMINOPHEN 650 MG: 325 TABLET ORAL at 11:35

## 2022-11-02 RX ADMIN — CLOPIDOGREL BISULFATE 75 MG: 75 TABLET ORAL at 08:01

## 2022-11-02 RX ADMIN — DICLOFENAC SODIUM 2 G: 10 GEL TOPICAL at 17:09

## 2022-11-02 RX ADMIN — HEPARIN SODIUM 5000 UNITS: 5000 INJECTION INTRAVENOUS; SUBCUTANEOUS at 08:01

## 2022-11-02 RX ADMIN — POLYETHYLENE GLYCOL 3350 17 G: 17 POWDER, FOR SOLUTION ORAL at 08:01

## 2022-11-02 RX ADMIN — DICLOFENAC SODIUM 2 G: 10 GEL TOPICAL at 11:35

## 2022-11-02 RX ADMIN — DICLOFENAC SODIUM 2 G: 10 GEL TOPICAL at 08:07

## 2022-11-02 RX ADMIN — ACETAMINOPHEN 650 MG: 325 TABLET ORAL at 06:16

## 2022-11-02 RX ADMIN — PRAVASTATIN SODIUM 40 MG: 40 TABLET ORAL at 08:01

## 2022-11-02 NOTE — PLAN OF CARE
Problem: MOBILITY - ADULT  Goal: Maintain or return to baseline ADL function  Description: INTERVENTIONS:  -  Assess patient's ability to carry out ADLs; assess patient's baseline for ADL function and identify physical deficits which impact ability to perform ADLs (bathing, care of mouth/teeth, toileting, grooming, dressing, etc )  - Assess/evaluate cause of self-care deficits   - Assess range of motion  - Assess patient's mobility; develop plan if impaired  - Assess patient's need for assistive devices and provide as appropriate  - Encourage maximum independence but intervene and supervise when necessary  - Involve family in performance of ADLs  - Assess for home care needs following discharge   - Consider OT consult to assist with ADL evaluation and planning for discharge  - Provide patient education as appropriate  Outcome: Progressing  Goal: Maintains/Returns to pre admission functional level  Description: INTERVENTIONS:  - Perform BMAT or MOVE assessment daily    - Set and communicate daily mobility goal to care team and patient/family/caregiver     - Collaborate with rehabilitation services on mobility goals if consulted  - Out of bed for toileting  - Record patient progress and toleration of activity level   Outcome: Progressing     Problem: INFECTION - ADULT  Goal: Absence or prevention of progression during hospitalization  Description: INTERVENTIONS:  - Assess and monitor for signs and symptoms of infection  - Monitor lab/diagnostic results  - Monitor all insertion sites, i e  indwelling lines, tubes, and drains  - Monitor endotracheal if appropriate and nasal secretions for changes in amount and color  - [place] appropriate cooling/warming therapies per order  - Administer medications as ordered  - Instruct and encourage patient and family to use good hand hygiene technique  - Identify and instruct in appropriate isolation precautions for identified infection/condition  Outcome: Progressing Problem: SAFETY ADULT  Goal: Maintain or return to baseline ADL function  Description: INTERVENTIONS:  -  Assess patient's ability to carry out ADLs; assess patient's baseline for ADL function and identify physical deficits which impact ability to perform ADLs (bathing, care of mouth/teeth, toileting, grooming, dressing, etc )  - Assess/evaluate cause of self-care deficits   - Assess range of motion  - Assess patient's mobility; develop plan if impaired  - Assess patient's need for assistive devices and provide as appropriate  - Encourage maximum independence but intervene and supervise when necessary  - Involve family in performance of ADLs  - Assess for home care needs following discharge   - Consider OT consult to assist with ADL evaluation and planning for discharge  - Provide patient education as appropriate  Outcome: Progressing  Goal: Maintains/Returns to pre admission functional level  Description: INTERVENTIONS:  - Perform BMAT or MOVE assessment daily    - Set and communicate daily mobility goal to care team and patient/family/caregiver     - Collaborate with rehabilitation services on mobility goals if consulted  - Out of bed for toileting  - Record patient progress and toleration of activity level   Outcome: Progressing  Goal: Patient will remain free of falls  Description: INTERVENTIONS:  - Educate patient/family on patient safety including physical limitations  - Instruct patient to call for assistance with activity   - Consult OT/PT to assist with strengthening/mobility   - Keep Call bell within reach  - Keep bed low and locked with side rails adjusted as appropriate  - Keep care items and personal belongings within reach  - Initiate and maintain comfort rounds  - Make Fall Risk Sign visible to staff  - Apply yellow socks and bracelet for high fall risk patients  - Consider moving patient to room near nurses station  Outcome: Progressing     Problem: DISCHARGE PLANNING  Goal: Discharge to home or other facility with appropriate resources  Description: INTERVENTIONS:  - Identify barriers to discharge w/patient and caregiver  - Arrange for needed discharge resources and transportation as appropriate  - Identify discharge learning needs (meds, wound care, etc )  - Arrange for interpretive services to assist at discharge as needed  - Refer to Case Management Department for coordinating discharge planning if the patient needs post-hospital services based on physician/advanced practitioner order or complex needs related to functional status, cognitive ability, or social support system  Outcome: Progressing     Problem: Knowledge Deficit  Goal: Patient/family/caregiver demonstrates understanding of disease process, treatment plan, medications, and discharge instructions  Description: Complete learning assessment and assess knowledge base    Interventions:  - Provide teaching at level of understanding  - Provide teaching via preferred learning methods  Outcome: Progressing     Problem: Prexisting or High Potential for Compromised Skin Integrity  Goal: Skin integrity is maintained or improved  Description: INTERVENTIONS:  - Identify patients at risk for skin breakdown  - Assess and monitor skin integrity  - Assess and monitor nutrition and hydration status  - Monitor labs   - Assess for incontinence   - Turn and reposition patient  - Assist with mobility/ambulation  - Relieve pressure over bony prominences  - Avoid friction and shearing  - Provide appropriate hygiene as needed including keeping skin clean and dry  - Evaluate need for skin moisturizer/barrier cream  - Collaborate with interdisciplinary team   - Patient/family teaching    Outcome: Progressing     Problem: Potential for Falls  Goal: Patient will remain free of falls  Description: INTERVENTIONS:  - Educate patient/family on patient safety including physical limitations  - Instruct patient to call for assistance with activity   - Consult OT/PT to assist with strengthening/mobility   - Keep Call bell within reach  - Keep bed low and locked with side rails adjusted as appropriate  - Keep care items and personal belongings within reach  - Initiate and maintain comfort rounds  - Make Fall Risk Sign visible to staff  - Apply yellow socks and bracelet for high fall risk patients  - Consider moving patient to room near nurses station  Outcome: Progressing

## 2022-11-02 NOTE — PROGRESS NOTES
In Person:  11/2/2022    [facility] did meet with Sherif Jackson at [place]  She did come in as a walk in and requested to speak with [facility]  Sherif Jackson did bring a medical bill to [facility] and asked what she should do with the bill  [facility] informed her pt's MA application is still pending as the  [id number] form has not been completed  She expressed understanding and agreed to go to [facility] in person and request the form as they have claimed to have mailed to pt twice however pt has not received it  [facility] provided address to local [facility] office  Next outreach is scheduled for 11/9/2022

## 2022-11-02 NOTE — PLAN OF CARE
Problem: MOBILITY - ADULT  Goal: Maintain or return to baseline ADL function  Description: INTERVENTIONS:  -  Assess patient's ability to carry out ADLs; assess patient's baseline for ADL function and identify physical deficits which impact ability to perform ADLs (bathing, care of mouth/teeth, toileting, grooming, dressing, etc )  - Assess/evaluate cause of self-care deficits   - Assess range of motion  - Assess patient's mobility; develop plan if impaired  - Assess patient's need for assistive devices and provide as appropriate  - Encourage maximum independence but intervene and supervise when necessary  - Involve family in performance of ADLs  - Assess for home care needs following discharge   - Consider OT consult to assist with ADL evaluation and planning for discharge  - Provide patient education as appropriate  Outcome: Progressing     Problem: INFECTION - ADULT  Goal: Absence or prevention of progression during hospitalization  Description: INTERVENTIONS:  - Assess and monitor for signs and symptoms of infection  - Monitor lab/diagnostic results  - Monitor all insertion sites, i e  indwelling lines, tubes, and drains  - Monitor endotracheal if appropriate and nasal secretions for changes in amount and color  - [place] appropriate cooling/warming therapies per order  - Administer medications as ordered  - Instruct and encourage patient and family to use good hand hygiene technique  - Identify and instruct in appropriate isolation precautions for identified infection/condition  Outcome: Progressing

## 2022-11-02 NOTE — NURSING NOTE
Went over discharge instructions  AVS given to patient  Went over medication and education  Family picking up medication tomorrow at [address]

## 2022-11-02 NOTE — PHYSICAL THERAPY NOTE
Physical Therapy Evaluation    Patient's Name: Veronica Martino    Admitting Diagnosis  Tachypnea [R06 82]  Dehydration [E86 0]  Arm pain [M79 603]  Knee pain [M25 569]  Tachycardia [R00 0]  Elevated white blood cell count [D72 829]  Elevated troponin [R77 8]    Problem List  Patient Active Problem List   Diagnosis    Sepsis ([facility] [address] )    Confusion    HTN (hypertension)    Chronic kidney disease    Dementia (HCC)    Vaginal prolapse    Fecal smearing    Anemia    At risk for delirium    Blindness of left eye    Rectal fissure    Chronic pain of left knee    Hyperlipidemia    History of CVA (cerebrovascular accident)       Past Medical History  Past Medical History:   Diagnosis Date    Dementia ([facility] [address] )     HLD (hyperlipidemia)     HTN (hypertension)     Vaginal prolapse        Past Surgical History  No past surgical history on file  Recent Imaging  CT chest abdomen pelvis wo contrast   Final Result by Kaylan Lui MD (10/31 9266)      1  No acute intrathoracic abnormality   2  No acute inflammatory changes in the abdomen or pelvis   3  Multiple small bladder diverticula   4  Unchanged severe compression deformity of T11   5   sigmoid diverticulosis                     Workstation performed: [id number]         X-ray chest 1 view portable   Final Result by Cierra Liz MD (10/31 5799)      No acute cardiopulmonary disease                    Workstation performed: [id number]             Recent Vital Signs  Vitals:    11/01/22 1551 11/01/22 2355 11/02/22 0549 11/02/22 0703   BP: 137/75 130/80  108/61   BP Location: Right arm Right arm  Right arm   Pulse: 102 97  95   Resp: 20 19  18   Temp: 98 9 °F (37 2 °C) 97 6 °F (36 4 °C)  97 5 °F (36 4 °C)   TempSrc: Temporal Temporal  Temporal   SpO2: 98% 99%  97%   Weight:   54 5 kg (120 lb 2 4 oz)    Height:            11/01/22 1520   PT Last Visit   PT Visit Date 11/02/22   Note Type   Note type Evaluation   Pain Assessment   Pain Assessment Tool 0-10   Pain Score No Pain Home Living   Additional Comments limited information due to cognitive deficits   Prior Function   Level of [place] Independent with functional mobility; Needs assistance with ADLs; Independent with IADLS   Lives With Daughter;Family   Receives Help From [facility]   Arousal/Participation Alert   Attention Attends with cues to redirect   Orientation Level Disoriented to situation;Disoriented to time;Disoriented to place;Oriented to person   Memory Decreased short term memory;Decreased recall of recent events;Decreased recall of precautions   Following Commands Follows one step commands with increased time or repetition   RLE Assessment   RLE Assessment   (4/5)   LLE Assessment   LLE Assessment   (4/5)   Coordination   Movements are Fluid and Coordinated 0   Coordination and Movement Description limited gross coordination with mobility   Sensation WFL   Bed Mobility   Supine to Sit 5  Supervision   Sit to Supine 5  Supervision   Transfers   Sit to Stand 5  Supervision   Stand to Sit 5  Supervision   Ambulation/Elevation   Gait pattern Step through pattern;Decreased toe off;Decreased heel strike; Short stride;Decreased foot clearance   Gait Assistance 5  Supervision   Additional items Verbal cues   Assistive Device None   Distance 200ft   Balance   Static Sitting Good   Dynamic Sitting Fair +   Static Standing Fair   Dynamic Standing Fair   Ambulatory Fair -   Endurance Deficit   Endurance Deficit Yes   Endurance Deficit Description decreased from baseline   Activity Tolerance   Activity Tolerance Patient tolerated treatment well   Medical Staff Made Aware spoke to CM   Nurse Made Aware spoke to RN   Assessment   Prognosis Good   Problem List Decreased strength;Decreased endurance; Impaired balance;Decreased mobility; Decreased coordination;Decreased cognition; Impaired judgement;Decreased safety awareness   Goals   Patient Goals to get her purse and go home   Recommendation   PT Discharge Recommendation No rehabilitation needs   AM-PAC Basic Mobility Inpatient   Turning in Bed Without Bedrails 4   Lying on Back to Sitting on Edge of Flat Bed 4   Moving Bed to Chair 3   Standing Up From Chair 3   Walk in Room 3   Climb 3-5 Stairs 3   Basic Mobility Inpatient Raw Score 20   Basic Mobility Standardized Score 43 99   Highest Level Of Mobility   JH-HLM Goal 6: Walk 10 steps or more   JH-HLM Achieved 7: Walk 25 feet or more   End of Consult   Patient Position at End of Consult Bedside chair; All needs within reach         ASSESSMENT                                                                                                                     Lui Frost is a 80 y o  female admitted to [facility] on 10/31/2022 for Sepsis ([facility] [address] )  Pt  has a past medical history of Dementia ([facility] [address] ), HLD (hyperlipidemia), HTN (hypertension), and Vaginal prolapse    PT was consulted and pt was seen on 11/2/2022 for mobility assessment and d/c planning  Pt presents supine in bed alert and agreeable to therapy  Impairments limiting pt at this time include impaired balance, decreased endurance, decreased coordination, decreased safety awareness, impaired judgement, decreased cognition, and decreased strength  Pt is currently functioning at a supervision assistance x1 level for bed mobility, supervision assistance x1 level for transfers, supervision assistance x1 level for ambulation with no assistive device,  The patient's AM-PAC Basic Mobility Inpatient Short Form Raw Score is 20  A Raw score of greater than 16 suggests the patient may benefit from discharge to home  Please also refer to the recommendation of the Physical Therapist for safe discharge planning      Recommendations                                                                                                                DME: None    Discharge Disposition:  Home with no needs      Shaylee Guzman PT, DPT

## 2022-11-02 NOTE — DISCHARGE SUMMARY
Discharge Summary - Jayla Caldwell    Patient Information: Keila Echevarria 80 y o  female MRN: [id number]  Unit/Bed#: 7T [facility] 716-01 Encounter: [id number]    Discharging Physician / Practitioner: Rocio Davidson MD  PCP: Pratibha Verde MD  Admission Date:   Admission Orders (From admission, onward)     Ordered        10/31/22 1411  INPATIENT ADMISSION  Once                      Discharge Date: 11/2/2022    Reason for Admission: Sepsis from UTI    Discharge Diagnoses:     Principal Problem:    UTI (urinary tract infection)  Active Problems:    HTN (hypertension)    Chronic kidney disease    Dementia ([facility] [address] )    Chronic pain of left knee    Hyperlipidemia    History of CVA (cerebrovascular accident)  Resolved Problems:    Ambulatory dysfunction    Other chest pain        * UTI (urinary tract infection)  Assessment & Plan  Sepsis now resolved  Urine culture positive for E coli  Patient denies urinary symptoms at discharge  Plan:   - Discharge patient home with nitrofurantoin 100 mg BID for 5 days    History of CVA (cerebrovascular accident)  Assessment & Plan  Likely contributory to current dementia (vascular)  Daughter denies any history of DVT  Plan:  - Discharge with home medication: clopidogrel 75 mg qd  Hyperlipidemia  Assessment & Plan  Lipid panel from 2/1/22: cholesterol 205, , HDL 59,     Plan:  - Discharge home with rosuvastatin 10 mg    Chronic pain of left knee  Assessment & Plan  With ambulatory dysfunction  Improved with topical Voltaren gel 1%  Plan:  - Family advised to continue assist on ADL  - Discharge home with topical Voltaren 1% and Tylenol 650 mg TID PRN    Dementia [facility])  Assessment & Plan  Patient currently at baseline  Oriented to person and place  Good support at home   Has appointment with Geriatrics in 4/19/2023    Plan:  - Discharge with home medications: gabapentin 100 mg, memantine 10 mg, melatonin 3mg       Chronic kidney disease  Assessment & Plan  Creatinine back to baseline  Cr: 1 95 >> 1 56 >> 1 39    Plan:  Follow up as outpatient  HTN (hypertension)  Assessment & Plan  BP today 138/80  Goal <150/90 per [facility]  Improved status post discontinuation of Losartan 100mg  Plan:  - Discharge home with amlodipine 10 mg tablet, Bisoprolol 5mg  - Follow up with PCP    Other chest pain-resolved as of 11/2/2022  Assessment & Plan  Resolved  ACS ruled out  HEART Score: 5pts (Moderate risk)    Of note: prior UTI episodes have presented with chest pain  Consultations During Hospital Stay:  · PT  · OT    Procedures Performed:   · none    Significant Findings / Test Results:   · UA: presence of leukocytes, WBC and bacteria  · Urine Culture: E coli    Incidental Findings:   · none    Test Results Pending at Discharge (will require follow up):   · none     Outpatient Tests Requested:  · none    Outpatient follow-up Requested:  • PCP      Complications:  none    Hospital Course:     Edmund Conklin is a 80 y o  female patient who originally presented to the hospital on 10/31/2022 due to worsening left knee pain, and increased agitation over the last week with accompanying SOB and chest pain as of the previous night  On admission was found to be tachypneic, and tachycardic with an elevated WBC so was admitted for SIRS criteria and for ACS rule out  ACS was ruled out due to normal EKG, and downtrending troponins  We looked for a source of infection, and found her to be positive for UTI  We treated her with cefepime IV renally dosed at 1g q24hrs for three days  Urine culture was positive for E  coli susceptible to most antibiotics  Patient remained stable throughout her stay  Today she was seen at bedside with senior resident  She was accompanied by her daughter and resting comfortably in bed  Patient states to feel better and has no pain or urinary symptoms at the moment  Plan was discussed with her daughter who agrees with discharge         Condition at Discharge: good     Discharge Day Visit / Exam:     Vitals: Blood Pressure: 138/80 (11/02/22 1555)  Pulse: 100 (11/02/22 1555)  Temperature: (!) 97 2 °F (36 2 °C) (11/02/22 1555)  Temp Source: Temporal (11/02/22 1555)  Respirations: 18 (11/02/22 1555)  Height: 5' 1" (154 9 cm) (10/31/22 1633)  Weight - Scale: 54 5 kg (120 lb 2 4 oz) (11/02/22 0549)  SpO2: 97 % (11/02/22 1555)  Exam:   Physical Exam  Constitutional:       General: She is not in acute distress  Appearance: Normal appearance  She is not ill-appearing  HENT:      Head: Normocephalic and atraumatic  Right Ear: External ear normal       Left Ear: External ear normal       Nose: No congestion or rhinorrhea  Eyes:      General: No scleral icterus  Cardiovascular:      Rate and Rhythm: Normal rate and regular rhythm  Pulses: Normal pulses  Heart sounds: Murmur heard  Pulmonary:      Breath sounds: Normal breath sounds  Abdominal:      General: Abdomen is flat  Bowel sounds are normal       Palpations: Abdomen is soft  Musculoskeletal:      Right lower leg: No edema  Left lower leg: No edema  Skin:     General: Skin is warm and dry  Capillary Refill: Capillary refill takes less than 2 seconds  Neurological:      Mental Status: She is alert  Mental status is at baseline  Psychiatric:         Mood and Affect: Mood normal          Behavior: Behavior normal          Discussion with Family: with daughter   Patient Information Sharing: With the consent of Lui Frost , their loved ones Lubna Abbasi ) were notified today by inpatient team of the patient’s condition and current plan  All questions answered  Discharge instructions/Information to patient and family:   See after visit summary for information provided to patient and family        Discharge Medications:     Acetaminophen 650 mg Oral Every 8 hours PRN     amLODIPine Besylate 10 mg Oral Daily     Bisoprolol Fumarate 2 5 mg Oral Daily     Clopidogrel Bisulfate 75 mg Oral Daily     Diclofenac Sodium 2 g Topical 4 times daily     Gabapentin 100 mg Oral Daily at bedtime     Hydrocortisone 2 5 % Topical 2 times daily     Melatonin 3 mg Oral Daily at bedtime     Memantine HCl 20 mg Oral Daily     Nitrofurantoin Monohyd Macro 100 mg Oral 2 times daily     QUEtiapine Fumarate 25 mg Oral Daily at bedtime     Rosuvastatin Calcium 10 mg Oral Daily        Provisions for Follow-Up Care:  See after visit summary for information related to follow-up care and any pertinent home health orders  Disposition:     Home    For Discharges to [facility] SNF:   · Not Applicable to this Patient - Not Applicable to this Patient    Planned Readmission: No     Discharge Statement:  I spent 30 minutes discharging the patient  This time was spent on the day of discharge  I had direct contact with the patient on the day of discharge  Greater than 50% of the total time was spent examining patient, answering all patient questions, arranging and discussing plan of care with patient as well as directly providing post-discharge instructions  Additional time then spent on discharge activities      ** Please Note: This note has been constructed using a voice recognition system **    [address] [place] [address]  11/02/22  7:36 PM

## 2022-11-03 ENCOUNTER — TRANSITIONAL CARE MANAGEMENT (OUTPATIENT)
Dept: FAMILY MEDICINE CLINIC | Facility: CLINIC | Age: 87
End: 2022-11-03

## 2022-11-05 LAB
BACTERIA BLD CULT: NORMAL
BACTERIA BLD CULT: NORMAL